# Patient Record
Sex: MALE | Race: BLACK OR AFRICAN AMERICAN | Employment: UNEMPLOYED | URBAN - METROPOLITAN AREA
[De-identification: names, ages, dates, MRNs, and addresses within clinical notes are randomized per-mention and may not be internally consistent; named-entity substitution may affect disease eponyms.]

---

## 2020-01-12 ENCOUNTER — APPOINTMENT (OUTPATIENT)
Dept: CT IMAGING | Age: 60
End: 2020-01-12
Attending: EMERGENCY MEDICINE
Payer: MEDICAID

## 2020-01-12 ENCOUNTER — APPOINTMENT (OUTPATIENT)
Dept: GENERAL RADIOLOGY | Age: 60
End: 2020-01-12
Attending: EMERGENCY MEDICINE
Payer: MEDICAID

## 2020-01-12 ENCOUNTER — HOSPITAL ENCOUNTER (EMERGENCY)
Age: 60
Discharge: HOME | End: 2020-01-12
Attending: EMERGENCY MEDICINE
Payer: MEDICAID

## 2020-01-12 VITALS
WEIGHT: 192.46 LBS | BODY MASS INDEX: 24.7 KG/M2 | OXYGEN SATURATION: 95 % | HEIGHT: 74 IN | TEMPERATURE: 98 F | RESPIRATION RATE: 19 BRPM | SYSTOLIC BLOOD PRESSURE: 211 MMHG | DIASTOLIC BLOOD PRESSURE: 125 MMHG | HEART RATE: 57 BPM

## 2020-01-12 DIAGNOSIS — R51.9 NONINTRACTABLE HEADACHE, UNSPECIFIED CHRONICITY PATTERN, UNSPECIFIED HEADACHE TYPE: ICD-10-CM

## 2020-01-12 DIAGNOSIS — I10 HYPERTENSION, UNSPECIFIED TYPE: Primary | ICD-10-CM

## 2020-01-12 LAB
ALBUMIN SERPL-MCNC: 3.7 G/DL (ref 3.5–5)
ALBUMIN/GLOB SERPL: 1 {RATIO} (ref 1.1–2.2)
ALP SERPL-CCNC: 73 U/L (ref 45–117)
ALT SERPL-CCNC: 45 U/L (ref 12–78)
ANION GAP SERPL CALC-SCNC: 3 MMOL/L (ref 5–15)
AST SERPL-CCNC: 27 U/L (ref 15–37)
ATRIAL RATE: 62 BPM
BASOPHILS # BLD: 0 K/UL (ref 0–0.1)
BASOPHILS NFR BLD: 1 % (ref 0–1)
BILIRUB SERPL-MCNC: 0.6 MG/DL (ref 0.2–1)
BUN SERPL-MCNC: 17 MG/DL (ref 6–20)
BUN/CREAT SERPL: 13 (ref 12–20)
CALCIUM SERPL-MCNC: 9.2 MG/DL (ref 8.5–10.1)
CALCULATED P AXIS, ECG09: 47 DEGREES
CALCULATED R AXIS, ECG10: 28 DEGREES
CALCULATED T AXIS, ECG11: 52 DEGREES
CHLORIDE SERPL-SCNC: 109 MMOL/L (ref 97–108)
CO2 SERPL-SCNC: 28 MMOL/L (ref 21–32)
CREAT SERPL-MCNC: 1.26 MG/DL (ref 0.7–1.3)
DIAGNOSIS, 93000: NORMAL
DIFFERENTIAL METHOD BLD: ABNORMAL
EOSINOPHIL # BLD: 0.1 K/UL (ref 0–0.4)
EOSINOPHIL NFR BLD: 1 % (ref 0–7)
ERYTHROCYTE [DISTWIDTH] IN BLOOD BY AUTOMATED COUNT: 13.2 % (ref 11.5–14.5)
GLOBULIN SER CALC-MCNC: 3.6 G/DL (ref 2–4)
GLUCOSE SERPL-MCNC: 112 MG/DL (ref 65–100)
HCT VFR BLD AUTO: 43.1 % (ref 36.6–50.3)
HGB BLD-MCNC: 14.8 G/DL (ref 12.1–17)
IMM GRANULOCYTES # BLD AUTO: 0 K/UL (ref 0–0.04)
IMM GRANULOCYTES NFR BLD AUTO: 0 % (ref 0–0.5)
LYMPHOCYTES # BLD: 2.2 K/UL (ref 0.8–3.5)
LYMPHOCYTES NFR BLD: 52 % (ref 12–49)
MCH RBC QN AUTO: 27.2 PG (ref 26–34)
MCHC RBC AUTO-ENTMCNC: 34.3 G/DL (ref 30–36.5)
MCV RBC AUTO: 79.1 FL (ref 80–99)
MONOCYTES # BLD: 0.4 K/UL (ref 0–1)
MONOCYTES NFR BLD: 10 % (ref 5–13)
NEUTS SEG # BLD: 1.6 K/UL (ref 1.8–8)
NEUTS SEG NFR BLD: 36 % (ref 32–75)
NRBC # BLD: 0 K/UL (ref 0–0.01)
NRBC BLD-RTO: 0 PER 100 WBC
P-R INTERVAL, ECG05: 174 MS
PLATELET # BLD AUTO: 219 K/UL (ref 150–400)
PMV BLD AUTO: 8.8 FL (ref 8.9–12.9)
POTASSIUM SERPL-SCNC: 3.9 MMOL/L (ref 3.5–5.1)
PROT SERPL-MCNC: 7.3 G/DL (ref 6.4–8.2)
Q-T INTERVAL, ECG07: 430 MS
QRS DURATION, ECG06: 94 MS
QTC CALCULATION (BEZET), ECG08: 436 MS
RBC # BLD AUTO: 5.45 M/UL (ref 4.1–5.7)
SODIUM SERPL-SCNC: 140 MMOL/L (ref 136–145)
VENTRICULAR RATE, ECG03: 62 BPM
WBC # BLD AUTO: 4.3 K/UL (ref 4.1–11.1)

## 2020-01-12 PROCEDURE — 36415 COLL VENOUS BLD VENIPUNCTURE: CPT

## 2020-01-12 PROCEDURE — 96375 TX/PRO/DX INJ NEW DRUG ADDON: CPT

## 2020-01-12 PROCEDURE — 71045 X-RAY EXAM CHEST 1 VIEW: CPT

## 2020-01-12 PROCEDURE — 70450 CT HEAD/BRAIN W/O DYE: CPT

## 2020-01-12 PROCEDURE — 85025 COMPLETE CBC W/AUTO DIFF WBC: CPT

## 2020-01-12 PROCEDURE — 74011250636 HC RX REV CODE- 250/636: Performed by: EMERGENCY MEDICINE

## 2020-01-12 PROCEDURE — 93005 ELECTROCARDIOGRAM TRACING: CPT

## 2020-01-12 PROCEDURE — 74011250637 HC RX REV CODE- 250/637: Performed by: EMERGENCY MEDICINE

## 2020-01-12 PROCEDURE — 80053 COMPREHEN METABOLIC PANEL: CPT

## 2020-01-12 PROCEDURE — 96374 THER/PROPH/DIAG INJ IV PUSH: CPT

## 2020-01-12 PROCEDURE — 99284 EMERGENCY DEPT VISIT MOD MDM: CPT

## 2020-01-12 PROCEDURE — 74011000250 HC RX REV CODE- 250: Performed by: EMERGENCY MEDICINE

## 2020-01-12 RX ORDER — HYDROCHLOROTHIAZIDE 25 MG/1
25 TABLET ORAL
Status: COMPLETED | OUTPATIENT
Start: 2020-01-12 | End: 2020-01-12

## 2020-01-12 RX ORDER — ACETAMINOPHEN 500 MG
1000 TABLET ORAL ONCE
Status: COMPLETED | OUTPATIENT
Start: 2020-01-12 | End: 2020-01-12

## 2020-01-12 RX ORDER — LABETALOL HYDROCHLORIDE 5 MG/ML
20 INJECTION, SOLUTION INTRAVENOUS ONCE
Status: COMPLETED | OUTPATIENT
Start: 2020-01-12 | End: 2020-01-12

## 2020-01-12 RX ORDER — KETOROLAC TROMETHAMINE 30 MG/ML
15 INJECTION, SOLUTION INTRAMUSCULAR; INTRAVENOUS
Status: COMPLETED | OUTPATIENT
Start: 2020-01-12 | End: 2020-01-12

## 2020-01-12 RX ORDER — HYDROCHLOROTHIAZIDE 25 MG/1
25 TABLET ORAL DAILY
Qty: 30 TAB | Refills: 2 | Status: SHIPPED | OUTPATIENT
Start: 2020-01-12 | End: 2020-01-22

## 2020-01-12 RX ORDER — ACETAMINOPHEN 325 MG/1
650 TABLET ORAL
Qty: 20 TAB | Refills: 0 | Status: SHIPPED | OUTPATIENT
Start: 2020-01-12 | End: 2022-03-24 | Stop reason: ALTCHOICE

## 2020-01-12 RX ADMIN — SODIUM CHLORIDE 500 ML: 900 INJECTION, SOLUTION INTRAVENOUS at 19:00

## 2020-01-12 RX ADMIN — LABETALOL HYDROCHLORIDE 20 MG: 5 INJECTION INTRAVENOUS at 19:00

## 2020-01-12 RX ADMIN — KETOROLAC TROMETHAMINE 15 MG: 30 INJECTION, SOLUTION INTRAMUSCULAR at 19:00

## 2020-01-12 RX ADMIN — HYDROCHLOROTHIAZIDE 25 MG: 25 TABLET ORAL at 21:22

## 2020-01-12 RX ADMIN — ACETAMINOPHEN 1000 MG: 500 TABLET ORAL at 19:00

## 2020-01-12 NOTE — ED PROVIDER NOTES
EMERGENCY DEPARTMENT HISTORY AND PHYSICAL EXAM      Date: 1/12/2020  Patient Name: [facility]    History of Presenting Illness     Chief Complaint   Patient presents with    Dizziness     patient reports dizziness and headache with position changes x1 week    Abscess     right lateral trunk patient reports a painful \"knot\"; reports it has been getting larger x2 months       History Provided By: Patient    HPI: [facility], 62 y.o. male with PMHx significant for medical problems as stated below, presents to the ED with cc of gradual onset, constant headache of mild intensity over the last 1 week. Patient reports headache is positional in nature. There is no associated vomiting, visual changes or any focal neurologic deficits. She reports noticing a knot on the right side of her trunk that has been there for 2 months. There is no associated fevers, chills, night sweats, chest pain, shortness of breath, or any other associated symptoms. No other exacerbating or militating factors. There are no other complaints, changes, or physical findings at this time. PCP: Other, MD Cuco    No current facility-administered medications on file prior to encounter. No current outpatient medications on file prior to encounter. Past History     Past Medical History:  No past medical history on file. Past Surgical History:  No past surgical history on file. Family History:  No family history on file. Social History:  Social History     Tobacco Use    Smoking status: Not on file   Substance Use Topics    Alcohol use: Not on file    Drug use: Not on file       Allergies:  No Known Allergies      Review of Systems   Review of Systems   Constitutional: Negative for chills, diaphoresis, fatigue and fever. HENT: Negative for ear pain and sore throat. Eyes: Negative for pain and redness. Respiratory: Negative for cough and shortness of breath.     Cardiovascular: Negative for chest pain and leg swelling. Gastrointestinal: Negative for abdominal pain, diarrhea, nausea and vomiting. Endocrine: Negative for cold intolerance and heat intolerance. Genitourinary: Negative for flank pain and hematuria. Musculoskeletal: Negative for back pain and neck stiffness. Skin: Negative for rash and wound. Neurological: Positive for headaches. Negative for dizziness and syncope. All other systems reviewed and are negative. Physical Exam   Physical Exam  Vitals signs and nursing note reviewed. Constitutional:       Appearance: He is well-developed. HENT:      Head: Normocephalic and atraumatic. Mouth/Throat:      Pharynx: No oropharyngeal exudate. Eyes:      Conjunctiva/sclera: Conjunctivae normal.      Pupils: Pupils are equal, round, and reactive to light. Neck:      Musculoskeletal: Normal range of motion. Cardiovascular:      Rate and Rhythm: Normal rate and regular rhythm. Heart sounds: No murmur. Pulmonary:      Effort: Pulmonary effort is normal. No respiratory distress. Breath sounds: Normal breath sounds. No wheezing. Abdominal:      General: Bowel sounds are normal. There is no distension. Palpations: Abdomen is soft. Tenderness: There is no tenderness. Musculoskeletal: Normal range of motion. General: No deformity. Skin:     General: Skin is warm and dry. Findings: No rash. Comments: There is a 2 cm epidermoid cyst along the right lateral trunk. There is no associated erythema or tenderness to palpation. These findings are not consistent with cellulitis or acute abscess. Neurological:      Mental Status: He is alert and oriented to person, place, and time. Coordination: Coordination normal.   Psychiatric:         Behavior: Behavior normal.         Diagnostic Study Results     Labs -     No results found for this or any previous visit (from the past 24 hour(s)).     Radiologic Studies -   XR CHEST PORT   Final Result IMPRESSION: No evidence of acute cardiopulmonary process. CT HEAD WO CONT   Final Result   IMPRESSION:      No acute intracranial abnormality        CT Results  (Last 48 hours)    None        CXR Results  (Last 48 hours)    None            Medical Decision Making   I am the first provider for this patient. I reviewed the vital signs, available nursing notes, past medical history, past surgical history, family history and social history. Vital Signs-Reviewed the patient's vital signs. No data found. Provider Notes (Medical Decision Making):   Patient with mild headache not consistent with subarachnoid hemorrhage, meningitis, or other acute emergent neurologic condition. Epidermoid cyst seen on exam that can be followed up as outpatient. Patient is noted to have elevated blood pressure and I will start him on patient's for this. ED Course:     Initial assessment performed. The patients presenting problems have been discussed, and they are in agreement with the care plan formulated and outlined with them. I have encouraged them to ask questions as they arise throughout their visit. Critical Care Time:     None    Disposition:  3:23 PM  Barbara You's  results have been reviewed with him. He has been counseled regarding his diagnosis. He verbally conveys understanding and agreement of the signs, symptoms, diagnosis, treatment and prognosis and additionally agrees to follow up as recommended with Cuco Gregorio MD in 24 - 48 hours. He also agrees with the care-plan and conveys that all of his questions have been answered. I have also put together some discharge instructions for him that include: 1) educational information regarding their diagnosis, 2) how to care for their diagnosis at home, as well a 3) list of reasons why they would want to return to the ED prior to their follow-up appointment, should their condition change. PLAN:  1.    Discharge Medication List as of 1/12/2020  9:18 PM      START taking these medications    Details   hydroCHLOROthiazide (HYDRODIURIL) 25 mg tablet Take 1 Tab by mouth daily for 10 days. , Normal, Disp-30 Tab, R-2      acetaminophen (TYLENOL) 325 mg tablet Take 2 Tabs by mouth every four (4) hours as needed for Pain., Normal, Disp-20 Tab, R-0           2. Follow-up Information     Follow up With Specialties Details Why Contact [facility] EMERGENCY DEPT Emergency Medicine In 1 day For a follow-up evaluation. [address] [place] [address] Internal Medicine In 1 week For a follow-up evaluation. [address] [facility]  [phone]          Return to ED if worse     Diagnosis     Clinical Impression:   1. Hypertension, unspecified type    2.  Nonintractable headache, unspecified chronicity pattern, unspecified headache type

## 2020-01-13 NOTE — DISCHARGE INSTRUCTIONS
Patient Education        Home Blood Pressure Test: About This Test  What is it? A home blood pressure test allows you to keep track of your blood pressure at home. Blood pressure is a measure of the force of blood against the walls of your arteries. Blood pressure readings include two numbers, such as 130/80 (say \"130 over 80\"). The first number is the systolic pressure. The second number is the diastolic pressure. Why is this test done? You may do this test at home to:  · Find out if you have high blood pressure. · Track your blood pressure if you have high blood pressure. · Track how well medicine is working to reduce high blood pressure. · Check how lifestyle changes, such as weight loss and exercise, are affecting blood pressure. How can you prepare for the test?  · Do not use caffeine, tobacco, or medicines known to raise blood pressure (such as nasal decongestant sprays) for at least 30 minutes before taking your blood pressure. · Do not exercise for at least 30 minutes before taking your blood pressure. What happens before the test?  Take your blood pressure while you feel comfortable and relaxed. Sit quietly with both feet on the floor for at least 5 minutes before the test.  What happens during the test?  · Sit with your arm slightly bent and resting on a table so that your upper arm is at the same level as your heart. · Roll up your sleeve or take off your shirt to expose your upper arm. · Wrap the blood pressure cuff around your upper arm so that the lower edge of the cuff is about 1 inch above the bend of your elbow. Proceed with the following steps depending on if you are using an automatic or manual pressure monitor. Automatic blood pressure monitors  · Press the on/off button on the automatic monitor and wait until the ready-to-measure \"heart\" symbol appears next to zero in the display window. · Press the start button. The cuff will inflate and deflate by itself.   · Your blood pressure numbers will appear on the screen. · Write your numbers in your log book, along with the date and time. Manual blood pressure monitors  · Place the earpieces of a stethoscope in your ears, and place the bell of the stethoscope over the artery, just below the cuff. · Close the valve on the rubber inflating bulb. · Squeeze the bulb rapidly with your opposite hand to inflate the cuff until the dial or column of mercury reads about 30 mm Hg higher than your usual systolic pressure. If you do not know your usual pressure, inflate the cuff to 210 mm Hg or until the pulse at your wrist disappears. · Open the pressure valve just slightly by twisting or pressing the valve on the bulb. · As you watch the pressure slowly fall, note the level on the dial at which you first start to hear a pulsing or tapping sound through the stethoscope. This is your systolic blood pressure. · Continue letting the air out slowly. The sounds will become muffled and will finally disappear. Note the pressure when the sounds completely disappear. This is your diastolic blood pressure. Let out all the remaining air. · Write your numbers in your log book, along with the date and time. What else should you know about the test?  It is more accurate to take the average of several readings made throughout the day than to rely on a single reading. It's normal for blood pressure to go up and down throughout the day. Follow-up care is a key part of your treatment and safety. Be sure to make and go to all appointments, and call your doctor if you are having problems. It's also a good idea to keep a list of the medicines you take. Where can you learn more? Go to http://be-oswaldo.info/. Enter C427 in the search box to learn more about \"Home Blood Pressure Test: About This Test.\"  Current as of: April 9, 2019  Content Version: 12.2  © 7598-0619 [employer].  Care instructions adapted under license by [facility] (which disclaims liability or warranty for this information). If you have questions about a medical condition or this instruction, always ask your healthcare professional. [address]  any warranty or liability for your use of this information. Patient Education        Learning About Diuretics for High Blood Pressure  Introduction  Diuretics help to lower blood pressure. This reduces your risk of a heart attack and stroke. It also reduces your risk of kidney disease. Diuretics cause your kidneys to remove sodium and water. They also relax the blood vessel walls. These help lower your blood pressure. Examples  · Chlorthalidone  · Hydrochlorothiazide  Possible side effects  There are some common side effects. They are:  · Too little potassium. · Feeling dizzy. · Rash. · Urinating a lot. · High blood sugar. (But this is not common.)  You may have other side effects. Check the information that comes with your medicine. What to know about taking this medicine  · You may take other medicines for blood pressure. Diuretics can help those work better. They can also prevent extra fluid in your body. · You may need to take potassium pills. Or you may have to watch how much potassium is in your food. Ask your doctor about this. · You may need blood tests to check your kidneys and your potassium level. · Take your medicines exactly as prescribed. Call your doctor if you think you are having a problem with your medicine. · Check with your doctor or pharmacist before you use any other medicines. This includes over-the-counter medicines. Make sure your doctor knows all of the medicines, vitamins, herbal products, and supplements you take. Taking some medicines together can cause problems. Where can you learn more? Go to http://be-oswaldo.info/. Enter Z660 in the search box to learn more about \"Learning About Diuretics for High Blood Pressure. \"  Current as of: April 9, 2019  Content Version: 12.2  © 7005-9993 [employer]. Care instructions adapted under license by [employer] (which disclaims liability or warranty for this information). If you have questions about a medical condition or this instruction, always ask your healthcare professional. [address] any warranty or liability for your use of this information. Patient Education        Headache: Care Instructions  Your Care Instructions    Headaches have many possible causes. Most headaches aren't a sign of a more serious problem, and they will get better on their own. Home treatment may help you feel better faster. The doctor has checked you carefully, but problems can develop later. If you notice any problems or new symptoms, get medical treatment right away. Follow-up care is a key part of your treatment and safety. Be sure to make and go to all appointments, and call your doctor if you are having problems. It's also a good idea to know your test results and keep a list of the medicines you take. How can you care for yourself at home? · Do not drive if you have taken a prescription pain medicine. · Rest in a quiet, dark room until your headache is gone. Close your eyes and try to relax or go to sleep. Don't watch TV or read. · Put a cold, moist cloth or cold pack on the painful area for 10 to 20 minutes at a time. Put a thin cloth between the cold pack and your skin. · Use a warm, moist towel or a heating pad set on low to relax tight shoulder and neck muscles. · Have someone gently massage your neck and shoulders. · Take pain medicines exactly as directed. ? If the doctor gave you a prescription medicine for pain, take it as prescribed. ? If you are not taking a prescription pain medicine, ask your doctor if you can take an over-the-counter medicine.   · Be careful not to take pain medicine more often than the instructions allow, because you may get worse or more frequent headaches when the medicine wears off. · Do not ignore new symptoms that occur with a headache, such as a fever, weakness or numbness, vision changes, or confusion. These may be signs of a more serious problem. To prevent headaches  · Keep a headache diary so you can figure out what triggers your headaches. Avoiding triggers may help you prevent headaches. Record when each headache began, how long it lasted, and what the pain was like (throbbing, aching, stabbing, or dull). Write down any other symptoms you had with the headache, such as nausea, flashing lights or dark spots, or sensitivity to bright light or loud noise. Note if the headache occurred near your period. List anything that might have triggered the headache, such as certain foods (chocolate, cheese, wine) or odors, smoke, bright light, stress, or lack of sleep. · Find healthy ways to deal with stress. Headaches are most common during or right after stressful times. Take time to relax before and after you do something that has caused a headache in the past.  · Try to keep your muscles relaxed by keeping good posture. Check your jaw, face, neck, and shoulder muscles for tension, and try relaxing them. When sitting at a desk, change positions often, and stretch for 30 seconds each hour. · Get plenty of sleep and exercise. · Eat regularly and well. Long periods without food can trigger a headache. · Treat yourself to a massage. Some people find that regular massages are very helpful in relieving tension. · Limit caffeine by not drinking too much coffee, tea, or soda. But don't quit caffeine suddenly, because that can also give you headaches. · Reduce eyestrain from computers by blinking frequently and looking away from the computer screen every so often. Make sure you have proper eyewear and that your monitor is set up properly, about an arm's length away. · Seek help if you have depression or anxiety.  Your headaches may be linked to these conditions. Treatment can both prevent headaches and help with symptoms of anxiety or depression. When should you call for help? Call 911 anytime you think you may need emergency care. For example, call if:    · You have signs of a stroke. These may include:  ? Sudden numbness, paralysis, or weakness in your face, arm, or leg, especially on only one side of your body. ? Sudden vision changes. ? Sudden trouble speaking. ? Sudden confusion or trouble understanding simple statements. ? Sudden problems with walking or balance. ? A sudden, severe headache that is different from past headaches.    Call your doctor now or seek immediate medical care if:    · You have a new or worse headache.     · Your headache gets much worse. Where can you learn more? Go to http://be-oswaldo.info/. Enter M271 in the search box to learn more about \"Headache: Care Instructions. \"  Current as of: March 28, 2019  Content Version: 12.2  © 4931-0265 [employer]. Care instructions adapted under license by [employer] (which disclaims liability or warranty for this information). If you have questions about a medical condition or this instruction, always ask your healthcare professional. [place] [address] any warranty or liability for your use of this information. [facility] Departments     For adult and child immunizations, family planning, TB screening, STD testing and women's health services. [facility]: [place] [phone]      [facility] 25   657 [facility]   [address] [place] [address]   170 [facility]: [address] [phone]      [address] [place] [address]          [address] [place] [address] [address]     For primary care services, woman and child wellness, and some clinics providing specialty care. U -- 1011 [facility]. [address] [place] [address] [place] cesia [facility] 200 [facility] [address] [place] [phone]   339 [facility] [address] [phone]   [address] [facility] 1604 [facility] 5850 [facility]  [phone]   [address] [facility] [address] [address] [address] [place] [phone]   [facility] 81 [facility] [phone]   [facility] [address] [place] [address] [phone]   [facility]: [facility] 700 [address], ext [address] 79 [facility] [address] [phone]     [address] [phone]   [facility] 5850 [facility]  [phone]   [employer]  [address] (www.[email or web address]/about/mission. asp) [phone]         Sexual Health/Woman Wellness Clinics    For STD/HIV testing and treatment, pregnancy testing and services, men's health, birth control services, LGBT services, and hepatitis/HPV vaccine services. [facility] [employer] for [place] [employer] [address] [facility] 75 [facility] [address] [facility] 1579 [address] [facility] [phone]   [facility] [address] 5th floor [phone]   Pregnancy [address] [facility] [address] [phone]         [place] 9942 [facility] [address] [facility] [address]   [phone]   [place]   [phone]   Women, Infant and Children's Services: [address] [phone]       [address] [phone]   [facility] Crisis Intervention   [phone]   [address] [place] [address]   [phone] 6125 [facility]     [phone]   [address] Crisis   1212 [facility] [phone]     Local Primary Care Physicians  64 [facility] [phone]  MD Mihai Lehman MD Merritt Elizondo MD [facility] Doctors [phone]  Praneeth Sample, [facility]  Rey Godfrey, MD Gail Young, MD Tianna Delong  [phone]  MD Leta Aguero MD [address] [facility] [address] [phone]  Anne Marie Maradiaga, MD Harvey Caro, MD Christi Jennings, MD Candace Bean, MD   [facility] [phone]  [facility] NTRNI EK, MD Lázaro Haines, MD Shey Schofield, NP [address] [phone]  Cortney Brown, MD Linda Leong, MD Tanvi Price, MD Simi Beaver, MD Forrest Rizzo, MD Melinda Fan, MD Hanna Oliveira, MD   33 57 [facility]  Yane Oliveira MD [facility] [phone]  Hai Mendoza, MD Viktoria Valencia, NP  Lashanda Draper, MD Raimundo Bobo, MD Billy Smith, MD Luis Corral, MD Jared Johnson, MD   9460 [facility] [phone]  Steph Newman, MD Marylee Stack, [facility]  Judah Calero, NP  Laila Emerson, MD Kelly Hogan, MD Nell Timmons, MD Sandy Lea, MD [facility] [phone]  MD Moshe Choudhury, MD Whit Qureshi, MD Rebekah Marrero, MD Cecile Clarke MD   [address] [phone]  Elayne Gallo, MD Ilene Harman, MD [place] [phone]  MD Yusef Briones, MD Kenia Glover MD   [facility] [phone]  MD Ana Chapin, MD Emmanuel Kaur, MD Shyann Neely, MD Tracee Nam, MD Alarconter Relic, NP  Jaden Salomon MD [facility]9 [facility]   [phone]  MD Halle Piñay, MD Yehuda Abreu, MD   2102 [facility] [phone]  Kody Mei, MD Jayden Macdonald, EN Mcleod, MICAH Mcleod, EN Méndez, MICAH Rowland, MD Abe Garcias, NP   Winston Schmitt,  Miscellaneous:  Gisel Taylor, [phone]

## 2021-11-02 ENCOUNTER — OFFICE VISIT (OUTPATIENT)
Dept: FAMILY MEDICINE CLINIC | Age: 61
End: 2021-11-02
Payer: COMMERCIAL

## 2021-11-02 DIAGNOSIS — R73.03 BORDERLINE DIABETES MELLITUS: ICD-10-CM

## 2021-11-02 DIAGNOSIS — Z76.89 ESTABLISHING CARE WITH NEW DOCTOR, ENCOUNTER FOR: Primary | ICD-10-CM

## 2021-11-02 DIAGNOSIS — Z12.11 COLON CANCER SCREENING: ICD-10-CM

## 2021-11-02 DIAGNOSIS — Z13.29 SCREENING FOR THYROID DISORDER: ICD-10-CM

## 2021-11-02 DIAGNOSIS — N52.9 ERECTILE DYSFUNCTION, UNSPECIFIED ERECTILE DYSFUNCTION TYPE: ICD-10-CM

## 2021-11-02 DIAGNOSIS — R35.0 FREQUENCY OF URINATION: ICD-10-CM

## 2021-11-02 DIAGNOSIS — Z12.5 SCREENING FOR PROSTATE CANCER: ICD-10-CM

## 2021-11-02 DIAGNOSIS — E78.5 DYSLIPIDEMIA (HIGH LDL; LOW HDL): ICD-10-CM

## 2021-11-02 DIAGNOSIS — I10 MALIGNANT HYPERTENSION: ICD-10-CM

## 2021-11-02 DIAGNOSIS — Z11.59 NEED FOR HEPATITIS C SCREENING TEST: ICD-10-CM

## 2021-11-02 DIAGNOSIS — E55.9 VITAMIN D DEFICIENCY: ICD-10-CM

## 2021-11-02 PROCEDURE — 99205 OFFICE O/P NEW HI 60 MIN: CPT | Performed by: INTERNAL MEDICINE

## 2021-11-02 RX ORDER — CLONIDINE HYDROCHLORIDE 0.1 MG/1
0.2 TABLET ORAL
Status: DISCONTINUED | OUTPATIENT
Start: 2021-11-02 | End: 2021-11-02

## 2021-11-02 RX ORDER — CLONIDINE HYDROCHLORIDE 0.1 MG/1
0.2 TABLET ORAL
Status: COMPLETED | OUTPATIENT
Start: 2021-11-02 | End: 2021-11-05

## 2021-11-02 RX ORDER — CLONIDINE HYDROCHLORIDE 0.1 MG/1
0.1 TABLET ORAL
Status: CANCELLED | OUTPATIENT
Start: 2021-11-02 | End: 2021-11-02

## 2021-11-02 RX ORDER — HYDROCHLOROTHIAZIDE 12.5 MG/1
CAPSULE ORAL
COMMUNITY
Start: 2021-08-10 | End: 2021-11-23 | Stop reason: DRUGHIGH

## 2021-11-02 RX ORDER — LISINOPRIL 20 MG/1
20 TABLET ORAL DAILY
Qty: 30 TABLET | Refills: 0 | Status: SHIPPED | OUTPATIENT
Start: 2021-11-02 | End: 2021-11-09 | Stop reason: DRUGHIGH

## 2021-11-02 RX ORDER — AMLODIPINE BESYLATE 10 MG/1
10 TABLET ORAL DAILY
Qty: 30 TABLET | Refills: 3 | Status: SHIPPED | OUTPATIENT
Start: 2021-11-02 | End: 2022-02-16 | Stop reason: SDUPTHER

## 2021-11-02 NOTE — PATIENT INSTRUCTIONS
Lisinopril (Prinivil, Zestril) - (By mouth) Why this medicine is used:  
Treats high blood pressure and heart failure. Contact a nurse or doctor right away if you have: · Blistering, peeling, red skin rash · Change in how much or how often you urinate · Confusion, weakness, uneven heartbeat, trouble breathing · Lightheadedness, dizziness, fainting · Numbness or tingling in your hands, feet, or lips Common side effects: · Dry cough © 2017 [address] Information is for End User's use only and may not be sold, redistributed or otherwise used for commercial purposes.

## 2021-11-02 NOTE — PROGRESS NOTES
Order placed for 0.1/2 tablet clonidine , per Verbal Order from Dr. Monik Mccann on 11/2/2021 due to high Blood Pressure 183/117. Recheck  BP the same.  Reported to

## 2021-11-02 NOTE — PROGRESS NOTES
Chief Complaint   Patient presents with    New Patient    Cyst     body trunk        HPI:  Gustavo Garcia is a 62 y.o. AA male with h/o hypertension presents accompanied by spouse to establish care. Blood pressure is markedly elevated. Patient has not been taking Amlodipine and HCTZ. Denies headaches, chest pain ankle swelling. Patient is also complaining of a lump/cyst on the side without pain. Also, he has additional complaints of difficulty sustaining erection. Erectile dysfunction could  multple causes including uncontrolled blood pressure    Review of Systems  As per hpi    Past Medical History:   Diagnosis Date    Hypertension      Past Surgical History:   Procedure Laterality Date    IR CHOLECYSTOSTOMY PERCUTANEOUS       Social History     Socioeconomic History    Marital status:      Spouse name: Not on file    Number of children: Not on file    Years of education: Not on file    Highest education level: Not on file   Tobacco Use    Smoking status: Current Every Day Smoker    Smokeless tobacco: Never Used   Substance and Sexual Activity    Alcohol use: Yes     Comment: social    Drug use: Yes     Types: Marijuana    Sexual activity: Yes     Partners: Female     Social Determinants of Health     Financial Resource Strain:     Difficulty of Paying Living Expenses:    Food Insecurity:     Worried About Running Out of Food in the Last Year:     Ran Out of Food in the Last Year:    Transportation Needs:     Lack of Transportation (Medical):      Lack of Transportation (Non-Medical):    Physical Activity:     Days of Exercise per Week:     Minutes of Exercise per Session:    Stress:     Feeling of Stress :    Social Connections:     Frequency of Communication with Friends and Family:     Frequency of Social Gatherings with Friends and Family:     Attends Oriental orthodox Services:     Active Member of Clubs or Organizations:     Attends Club or Organization Meetings:     Marital Status:      Family History   Problem Relation Age of Onset    Diabetes Mother      Current Outpatient Medications   Medication Sig Dispense Refill    hydroCHLOROthiazide (MICROZIDE) 12.5 mg capsule TAKE 1 CAPSULE BY MOUTH EVERY DAY IN THE MORNING      acetaminophen (TYLENOL) 325 mg tablet Take 2 Tabs by mouth every four (4) hours as needed for Pain. 20 Tab 0     No Known Allergies    Objective:  Visit Vitals  BP (!) 183/117   Pulse 71   Temp 98.4 °F (36.9 °C) (Temporal)   Resp 20   Ht 6' 2\" (1.88 m)   Wt 205 lb (93 kg)   SpO2 100%   BMI 26.32 kg/m²     Physical Exam:   General appearance - alert, well appearing in no distress  Mental status - alert, oriented to person, place, and time  EYE-PERRL, EOMI  ENT-ENT exam normal, no neck nodes or sinus tenderness  Neck - supple, no significant adenopathy   Chest - clear to auscultation, no wheezes, rales or rhonchi  Heart - normal rate, regular rhythm, no murmurs  Abdomen - soft, nontender, nondistended, no organomegaly  Ext-peripheral pulses normal, no pedal edema  Neuro -alert, oriented, normal speech, no focal findings   Back-full range of motion, no tenderness, palpable spasm or pain on motion     Results for orders placed or performed during the hospital encounter of 01/12/20   CBC WITH AUTOMATED DIFF   Result Value Ref Range    WBC 4.3 4.1 - 11.1 K/uL    RBC 5.45 4.10 - 5.70 M/uL    HGB 14.8 12.1 - 17.0 g/dL    HCT 43.1 36.6 - 50.3 %    MCV 79.1 (L) 80.0 - 99.0 FL    MCH 27.2 26.0 - 34.0 PG    MCHC 34.3 30.0 - 36.5 g/dL    RDW 13.2 11.5 - 14.5 %    PLATELET 080 416 - 107 K/uL    MPV 8.8 (L) 8.9 - 12.9 FL    NRBC 0.0 0  WBC    ABSOLUTE NRBC 0.00 0.00 - 0.01 K/uL    NEUTROPHILS 36 32 - 75 %    LYMPHOCYTES 52 (H) 12 - 49 %    MONOCYTES 10 5 - 13 %    EOSINOPHILS 1 0 - 7 %    BASOPHILS 1 0 - 1 %    IMMATURE GRANULOCYTES 0 0.0 - 0.5 %    ABS. NEUTROPHILS 1.6 (L) 1.8 - 8.0 K/UL    ABS. LYMPHOCYTES 2.2 0.8 - 3.5 K/UL    ABS. MONOCYTES 0.4 0.0 - 1.0 K/UL    ABS. EOSINOPHILS 0.1 0.0 - 0.4 K/UL    ABS. BASOPHILS 0.0 0.0 - 0.1 K/UL    ABS. IMM. GRANS. 0.0 0.00 - 0.04 K/UL    DF AUTOMATED     METABOLIC PANEL, COMPREHENSIVE   Result Value Ref Range    Sodium 140 136 - 145 mmol/L    Potassium 3.9 3.5 - 5.1 mmol/L    Chloride 109 (H) 97 - 108 mmol/L    CO2 28 21 - 32 mmol/L    Anion gap 3 (L) 5 - 15 mmol/L    Glucose 112 (H) 65 - 100 mg/dL    BUN 17 6 - 20 MG/DL    Creatinine 1.26 0.70 - 1.30 MG/DL    BUN/Creatinine ratio 13 12 - 20      GFR est AA >60 >60 ml/min/1.73m2    GFR est non-AA 59 (L) >60 ml/min/1.73m2    Calcium 9.2 8.5 - 10.1 MG/DL    Bilirubin, total 0.6 0.2 - 1.0 MG/DL    ALT (SGPT) 45 12 - 78 U/L    AST (SGOT) 27 15 - 37 U/L    Alk. phosphatase 73 45 - 117 U/L    Protein, total 7.3 6.4 - 8.2 g/dL    Albumin 3.7 3.5 - 5.0 g/dL    Globulin 3.6 2.0 - 4.0 g/dL    A-G Ratio 1.0 (L) 1.1 - 2.2     EKG, 12 LEAD, INITIAL   Result Value Ref Range    Ventricular Rate 62 BPM    Atrial Rate 62 BPM    P-R Interval 174 ms    QRS Duration 94 ms    Q-T Interval 430 ms    QTC Calculation (Bezet) 436 ms    Calculated P Axis 47 degrees    Calculated R Axis 28 degrees    Calculated T Axis 52 degrees    Diagnosis       Normal sinus rhythm  Voltage criteria for left ventricular hypertrophy  Nonspecific T wave abnormality  No previous ECGs available  Confirmed by HUMBERTO Prather ([id number]) on 1/12/2020 11:57:19 PM       Assessment/Plan:  Diagnoses and all orders for this visit:    Establishing care with new doctor, encounter for  -     CBC W/O DIFF; Future    Malignant hypertension  -     METABOLIC PANEL, COMPREHENSIVE; Future  -     CBC W/O DIFF; Future  -     amLODIPine (NORVASC) 10 mg tablet; Take 1 Tablet by mouth daily. , Normal, Disp-30 Tablet, R-3  -     Discontinue: cloNIDine HCL (CATAPRES) tablet 0.2 mg; 0.2 mg, Oral, NOW, 1 dose, On Tue 11/2/21 at 1000  -     lisinopriL (PRINIVIL, ZESTRIL) 20 mg tablet; Take 1 Tablet by mouth daily. , Normal, Disp-30 Tablet, R-0  -     cloNIDine HCL (CATAPRES) tablet 0.2 mg; 0.2 mg, Oral, NOW, 1 dose, On Tue 11/2/21 at 1900    Need for hepatitis C screening test  -     HEPATITIS C AB; Future    Dyslipidemia (high LDL; low HDL)  -     LIPID PANEL; Future    Borderline diabetes mellitus  -     HEMOGLOBIN A1C WITH EAG; Future    Vitamin D deficiency  -     VITAMIN D, 25 HYDROXY; Future    Screening for prostate cancer  -     PSA, DIAGNOSTIC (PROSTATE SPECIFIC AG); Future    Frequency of urination  -     URINALYSIS W/ RFLX MICROSCOPIC; Future    Screening for thyroid disorder  -     TSH 3RD GENERATION; Future    Colon cancer screening  -     REFERRAL TO GASTROENTEROLOGY    Erectile dysfunction, unspecified erectile dysfunction type  -     TESTOSTERONE, FREE & TOTAL; Future    Other orders  -     Cancel: cloNIDine HCL (CATAPRES) tablet 0.1 mg; 0.1 mg, Oral, NOW, 1 dose, On Tue 11/2/21 at 1000        Patient Instructions      Lisinopril (Prinivil, Zestril) - (By mouth)   Why this medicine is used:   Treats high blood pressure and heart failure. Contact a nurse or doctor right away if you have:  · Blistering, peeling, red skin rash  · Change in how much or how often you urinate  · Confusion, weakness, uneven heartbeat, trouble breathing  · Lightheadedness, dizziness, fainting  · Numbness or tingling in your hands, feet, or lips     Common side effects:  · Dry cough  © 2017 [facility] [place] Information is for End User's use only and may not be sold, redistributed or otherwise used for commercial purposes. Follow-up and Dispositions    · Return in about 1 week (around 11/9/2021) for f/u results and BP.

## 2021-11-05 VITALS
RESPIRATION RATE: 20 BRPM | BODY MASS INDEX: 26.31 KG/M2 | OXYGEN SATURATION: 100 % | SYSTOLIC BLOOD PRESSURE: 183 MMHG | WEIGHT: 205 LBS | HEART RATE: 71 BPM | HEIGHT: 74 IN | DIASTOLIC BLOOD PRESSURE: 117 MMHG | TEMPERATURE: 98.4 F

## 2021-11-05 RX ADMIN — CLONIDINE HYDROCHLORIDE 0.2 MG: 0.1 TABLET ORAL at 13:01

## 2021-11-09 ENCOUNTER — OFFICE VISIT (OUTPATIENT)
Dept: FAMILY MEDICINE CLINIC | Age: 61
End: 2021-11-09
Payer: COMMERCIAL

## 2021-11-09 VITALS
BODY MASS INDEX: 26.69 KG/M2 | OXYGEN SATURATION: 98 % | RESPIRATION RATE: 20 BRPM | TEMPERATURE: 98.9 F | DIASTOLIC BLOOD PRESSURE: 87 MMHG | WEIGHT: 208 LBS | HEIGHT: 74 IN | SYSTOLIC BLOOD PRESSURE: 151 MMHG | HEART RATE: 70 BPM

## 2021-11-09 DIAGNOSIS — E55.9 VITAMIN D DEFICIENCY: ICD-10-CM

## 2021-11-09 DIAGNOSIS — I10 HYPERTENSION GOAL BP (BLOOD PRESSURE) < 130/80: Primary | ICD-10-CM

## 2021-11-09 DIAGNOSIS — R76.8 HEPATITIS C ANTIBODY TEST POSITIVE: ICD-10-CM

## 2021-11-09 PROCEDURE — 99214 OFFICE O/P EST MOD 30 MIN: CPT | Performed by: INTERNAL MEDICINE

## 2021-11-09 RX ORDER — ACETAMINOPHEN 500 MG
2000 TABLET ORAL DAILY
Qty: 90 CAPSULE | Refills: 1 | Status: SHIPPED | OUTPATIENT
Start: 2021-11-09 | End: 2022-02-16 | Stop reason: SDUPTHER

## 2021-11-09 RX ORDER — LISINOPRIL 40 MG/1
40 TABLET ORAL DAILY
Qty: 30 TABLET | Refills: 3 | Status: SHIPPED | OUTPATIENT
Start: 2021-11-09 | End: 2021-12-07 | Stop reason: ALTCHOICE

## 2021-11-09 NOTE — PATIENT INSTRUCTIONS
Hepatitis C: Care Instructions  Overview     Hepatitis C is an infection of the liver caused by a virus. This virus spreads when blood or body fluids from an infected person enter another person's body. This can happen when people share needles, razor blades, or toothbrushes. It can also spread through sex. The virus doesn't always cause symptoms. But you may feel tired. And you may have a headache, sore muscles, nausea, and pain in the upper right belly. Other symptoms include yellowish skin and dark urine. Home treatment can help ease symptoms. And your doctor may prescribe antiviral medicine. Long-term infection can lead to severe liver damage. So make sure to go to your follow-up appointments. Follow-up care is a key part of your treatment and safety. Be sure to make and go to all appointments, and call your doctor if you are having problems. It's also a good idea to know your test results and keep a list of the medicines you take. How can you care for yourself at home? · Be safe with medicines. If your doctor prescribes antiviral medicine, take it exactly as prescribed. Call your doctor if you think you are having a problem with your medicine. · Do not drink alcohol. Alcohol can damage the liver. Tell your doctor if you need help to quit. Counseling, support groups, and sometimes medicines can help you stay sober. · Do not take drugs or herbal medicines. They can make liver problems worse. · Make sure your doctor knows all of the medicines you take. Some medicines, such as acetaminophen (Tylenol), can make liver problems worse. Do not take any new medicines unless your doctor tells you to. This includes over-the-counter medicines. · Maintain a healthy lifestyle. Get plenty of exercise if you feel up to it. Eat a healthy diet. · Drink plenty of fluids.  If you have kidney, heart, or liver disease and have to limit fluids, talk with your doctor before you increase the amount of fluids you drink.  · Get the vaccines (if you have not already) to protect yourself from hepatitis A and hepatitis B, influenza, and pneumococcus. · The infection can make you itch. Keep cool and stay out of the sun. Try to wear cotton clothing. Talk to your doctor about medicines for itching. Follow the instructions on the label. · If you feel depressed, talk to your doctor about treatment. Many people who have long-term illnesses get depressed. Keep in mind that antiviral medicine can make depression worse. To avoid spreading hepatitis C to others  · Tell the people that you live with or have sex with about your illness as soon as you can. · Don't share needles to inject drugs. Don't share other equipment (such as cotton, spoons, and water) with others. Find out if a needle exchange program is available in your area, and use it. Get into a drug treatment program.  · Practice safer sex. Reduce your number of sex partners if you have more than one. Unless you are in a long-term relationship in which neither partner has sex with anyone else, always use latex condoms when you have sex. · Don't donate blood or blood products, organs, semen, or eggs (ova). · Make sure that all equipment is sterilized if you get a tattoo, have your body pierced, or have acupuncture. · Do not share your personal items. These include razors, toothbrushes, towels, and nail files. · Tell your doctor, dentist, and anyone else who may come in contact with your blood about your illness. · Prevent others from coming in contact with your blood and other body fluids. Keep any cuts, scrapes, or blisters covered. · Wash your handsand any object that has come in contact with your bloodthoroughly with water and soap. When should you call for help? Call 911 anytime you think you may need emergency care. For example, call if:    · You have trouble breathing.     · You vomit blood or what looks like coffee grounds.    Call your doctor now or seek immediate medical care if:    · You feel very sleepy or confused.     · You have a fever.     · There is a new or increasing yellow tint to your skin or the whites of your eyes.     · You have new or worse belly pain.     · You have any abnormal bleeding, such as:  ? Nosebleeds. ? Vaginal bleeding that is different (heavier, more frequent, at a different time of the month) than what you are used to.  ? Bloody or black stools, or rectal bleeding. ? Bloody or pink urine. Watch closely for changes in your health, and be sure to contact your doctor if:    · You have any problems.     · Your belly is getting bigger.     · You are gaining weight. Where can you learn more? Go to http://www.gray.com/  Enter Z142 in the search box to learn more about \"Hepatitis C: Care Instructions. \"  Current as of: July 1, 2021               Content Version: 13.0  © 9562-5840 [employer]. Care instructions adapted under license by [employer] (which disclaims liability or warranty for this information). If you have questions about a medical condition or this instruction, always ask your healthcare professional. [place] [address] any warranty or liability for your use of this information.

## 2021-11-09 NOTE — PROGRESS NOTES
Chief Complaint   Patient presents with    Blood Pressure Check    Letter for School/Work     return back to work on 11/10/2021     HPI:  Nikhil Franklin is a 62 y.o. AA male presents to follow up blood pressure treatment and to discuss/review lab results. Serum Vit D level is slightly low. Supplement has been called in to pharmacy. Testosterone level is normal.    Cholesterol level is normal. There is mild renal insufficiency, advised to increase water intake. Hep C AB is positive  Blood pressure is better but not at goal. Medication will be adjusted. Review of Systems  As per hpi    Past Medical History:   Diagnosis Date    Hypertension      Past Surgical History:   Procedure Laterality Date    IR CHOLECYSTOSTOMY PERCUTANEOUS       Social History     Socioeconomic History    Marital status:    Tobacco Use    Smoking status: Current Every Day Smoker    Smokeless tobacco: Never Used   Substance and Sexual Activity    Alcohol use: Yes     Comment: social    Drug use: Yes     Types: Marijuana    Sexual activity: Yes     Partners: Female     Family History   Problem Relation Age of Onset    Diabetes Mother      Current Outpatient Medications   Medication Sig Dispense Refill    hydroCHLOROthiazide (MICROZIDE) 12.5 mg capsule TAKE 1 CAPSULE BY MOUTH EVERY DAY IN THE MORNING      amLODIPine (NORVASC) 10 mg tablet Take 1 Tablet by mouth daily. 30 Tablet 3    lisinopriL (PRINIVIL, ZESTRIL) 20 mg tablet Take 1 Tablet by mouth daily. 30 Tablet 0    acetaminophen (TYLENOL) 325 mg tablet Take 2 Tabs by mouth every four (4) hours as needed for Pain.  20 Tab 0     No Known Allergies    Objective:  Visit Vitals  BP (!) 151/87 Comment: took BP medication   Pulse 70   Temp 98.9 °F (37.2 °C) (Temporal)   Resp 20   Ht 6' 2\" (1.88 m)   Wt 208 lb (94.3 kg)   SpO2 98%   BMI 26.71 kg/m²     Physical Exam:   General appearance - alert, well appearing in no distress  Mental status - alert, oriented to person, place, and time  Chest - clear to auscultation, no wheezes, rales or rhonchi  Heart - normal rate, regular rhythm  Abdomen - soft, nontender, nondistended, no organomegaly  Ext-peripheral pulses normal, no pedal edema    Results for orders placed or performed in visit on 11/02/21   TESTOSTERONE, FREE & TOTAL   Result Value Ref Range    Testosterone 833 264 - 916 ng/dL    Free testosterone (Direct) 7.7 6.6 - 18.1 pg/mL   TSH 3RD GENERATION   Result Value Ref Range    TSH 0.91 0.36 - 3.74 uIU/mL   URINALYSIS W/ RFLX MICROSCOPIC   Result Value Ref Range    Color YELLOW/STRAW      Appearance CLEAR CLEAR      Specific gravity 1.016 1.003 - 1.030      pH (UA) 5.0 5.0 - 8.0      Protein Negative Negative mg/dL    Glucose Negative Negative mg/dL    Ketone Negative Negative mg/dL    Bilirubin Negative Negative      Blood Negative Negative      Urobilinogen 0.2 0.2 - 1.0 EU/dL    Nitrites Negative Negative      Leukocyte Esterase Negative Negative     PSA, DIAGNOSTIC (PROSTATE SPECIFIC AG)   Result Value Ref Range    Prostate Specific Ag 1.6 0.01 - 4.0 ng/mL   VITAMIN D, 25 HYDROXY   Result Value Ref Range    Vitamin D 25-Hydroxy 21.8 (L) 30 - 100 ng/mL   HEMOGLOBIN A1C WITH EAG   Result Value Ref Range    Hemoglobin A1c 5.5 4.0 - 5.6 %    Est. average glucose 111 mg/dL   LIPID PANEL   Result Value Ref Range    Cholesterol, total 122 <200 MG/DL    Triglyceride 130 <150 MG/DL    HDL Cholesterol 40 MG/DL    LDL, calculated 56 0 - 100 MG/DL    VLDL, calculated 26 MG/DL    CHOL/HDL Ratio 3.1 0.0 - 5.0     CBC W/O DIFF   Result Value Ref Range    WBC 4.2 4.1 - 11.1 K/uL    RBC 5.07 4. 10 - 5.70 M/uL    HGB 13.9 12.1 - 17.0 g/dL    HCT 41.1 36.6 - 50.3 %    MCV 81.1 80.0 - 99.0 FL    MCH 27.4 26.0 - 34.0 PG    MCHC 33.8 30.0 - 36.5 g/dL    RDW 13.7 11.5 - 14.5 %    PLATELET 871 381 - 667 K/uL    MPV 8.7 (L) 8.9 - 12.9 FL    NRBC 0.0 0  WBC    ABSOLUTE NRBC 0.00 0.00 - 3.72 K/uL   METABOLIC PANEL, COMPREHENSIVE   Result Value Ref Range    Sodium 137 136 - 145 mmol/L    Potassium 4.4 3.5 - 5.1 mmol/L    Chloride 108 97 - 108 mmol/L    CO2 28 21 - 32 mmol/L    Anion gap 1 (L) 5 - 15 mmol/L    Glucose 107 (H) 65 - 100 mg/dL    BUN 17 6 - 20 MG/DL    Creatinine 1.37 (H) 0.70 - 1.30 MG/DL    BUN/Creatinine ratio 12 12 - 20      GFR est AA >60 >60 ml/min/1.73m2    GFR est non-AA 53 (L) >60 ml/min/1.73m2    Calcium 9.5 8.5 - 10.1 MG/DL    Bilirubin, total 0.4 0.2 - 1.0 MG/DL    ALT (SGPT) 37 12 - 78 U/L    AST (SGOT) 27 15 - 37 U/L    Alk. phosphatase 67 45 - 117 U/L    Protein, total 7.0 6.4 - 8.2 g/dL    Albumin 3.6 3.5 - 5.0 g/dL    Globulin 3.4 2.0 - 4.0 g/dL    A-G Ratio 1.1 1.1 - 2.2     HEPATITIS C AB   Result Value Ref Range    Hepatitis C virus Ab >11.00 Index    Hep C virus Ab Interp. REACTIVE (A) NONREACTIVE         Assessment/Plan:  Diagnoses and all orders for this visit:    Hypertension goal BP (blood pressure) < 130/80  -     lisinopriL (PRINIVIL, ZESTRIL) 40 mg tablet; Take 1 Tablet by mouth daily. , Normal, Disp-30 Tablet, R-3    Hepatitis C antibody test positive  -     HCV RNA THANIA QUALITATIVE; Future    Vitamin D deficiency  -     cholecalciferol (VITAMIN D3) (2,000 UNITS /50 MCG) cap capsule; Take 1 Capsule by mouth daily. , Normal, Disp-90 Capsule, R-1      Patient Instructions          Hepatitis C: Care Instructions  Overview     Hepatitis C is an infection of the liver caused by a virus. This virus spreads when blood or body fluids from an infected person enter another person's body. This can happen when people share needles, razor blades, or toothbrushes. It can also spread through sex. The virus doesn't always cause symptoms. But you may feel tired. And you may have a headache, sore muscles, nausea, and pain in the upper right belly. Other symptoms include yellowish skin and dark urine. Home treatment can help ease symptoms. And your doctor may prescribe antiviral medicine.   Long-term infection can lead to severe liver damage. So make sure to go to your follow-up appointments. Follow-up care is a key part of your treatment and safety. Be sure to make and go to all appointments, and call your doctor if you are having problems. It's also a good idea to know your test results and keep a list of the medicines you take. How can you care for yourself at home? · Be safe with medicines. If your doctor prescribes antiviral medicine, take it exactly as prescribed. Call your doctor if you think you are having a problem with your medicine. · Do not drink alcohol. Alcohol can damage the liver. Tell your doctor if you need help to quit. Counseling, support groups, and sometimes medicines can help you stay sober. · Do not take drugs or herbal medicines. They can make liver problems worse. · Make sure your doctor knows all of the medicines you take. Some medicines, such as acetaminophen (Tylenol), can make liver problems worse. Do not take any new medicines unless your doctor tells you to. This includes over-the-counter medicines. · Maintain a healthy lifestyle. Get plenty of exercise if you feel up to it. Eat a healthy diet. · Drink plenty of fluids. If you have kidney, heart, or liver disease and have to limit fluids, talk with your doctor before you increase the amount of fluids you drink. · Get the vaccines (if you have not already) to protect yourself from hepatitis A and hepatitis B, influenza, and pneumococcus. · The infection can make you itch. Keep cool and stay out of the sun. Try to wear cotton clothing. Talk to your doctor about medicines for itching. Follow the instructions on the label. · If you feel depressed, talk to your doctor about treatment. Many people who have long-term illnesses get depressed. Keep in mind that antiviral medicine can make depression worse. To avoid spreading hepatitis C to others  · Tell the people that you live with or have sex with about your illness as soon as you can.   · Don't share needles to inject drugs. Don't share other equipment (such as cotton, spoons, and water) with others. Find out if a needle exchange program is available in your area, and use it. Get into a drug treatment program.  · Practice safer sex. Reduce your number of sex partners if you have more than one. Unless you are in a long-term relationship in which neither partner has sex with anyone else, always use latex condoms when you have sex. · Don't donate blood or blood products, organs, semen, or eggs (ova). · Make sure that all equipment is sterilized if you get a tattoo, have your body pierced, or have acupuncture. · Do not share your personal items. These include razors, toothbrushes, towels, and nail files. · Tell your doctor, dentist, and anyone else who may come in contact with your blood about your illness. · Prevent others from coming in contact with your blood and other body fluids. Keep any cuts, scrapes, or blisters covered. · Wash your handsand any object that has come in contact with your bloodthoroughly with water and soap. When should you call for help? Call 911 anytime you think you may need emergency care. For example, call if:    · You have trouble breathing.     · You vomit blood or what looks like coffee grounds. Call your doctor now or seek immediate medical care if:    · You feel very sleepy or confused.     · You have a fever.     · There is a new or increasing yellow tint to your skin or the whites of your eyes.     · You have new or worse belly pain.     · You have any abnormal bleeding, such as:  ? Nosebleeds. ? Vaginal bleeding that is different (heavier, more frequent, at a different time of the month) than what you are used to.  ? Bloody or black stools, or rectal bleeding. ? Bloody or pink urine. Watch closely for changes in your health, and be sure to contact your doctor if:    · You have any problems.     · Your belly is getting bigger.     · You are gaining weight.    Where can you learn more? Go to http://www.gray.com/  Enter R920 in the search box to learn more about \"Hepatitis C: Care Instructions. \"  Current as of: July 1, 2021               Content Version: 13.0  © 3522-8741 [employer]. Care instructions adapted under license by [employer] (which disclaims liability or warranty for this information). If you have questions about a medical condition or this instruction, always ask your healthcare professional. [place] [address] any warranty or liability for your use of this information. Follow-up and Dispositions    · Return in about 2 weeks (around 11/23/2021) for f/u result and blood pressure.

## 2021-11-23 ENCOUNTER — OFFICE VISIT (OUTPATIENT)
Dept: FAMILY MEDICINE CLINIC | Age: 61
End: 2021-11-23
Payer: COMMERCIAL

## 2021-11-23 VITALS
RESPIRATION RATE: 20 BRPM | DIASTOLIC BLOOD PRESSURE: 87 MMHG | HEIGHT: 74 IN | BODY MASS INDEX: 26.18 KG/M2 | SYSTOLIC BLOOD PRESSURE: 152 MMHG | TEMPERATURE: 98.8 F | HEART RATE: 77 BPM | OXYGEN SATURATION: 93 % | WEIGHT: 204 LBS

## 2021-11-23 DIAGNOSIS — B17.10 ACUTE HEPATITIS C VIRUS INFECTION WITHOUT HEPATIC COMA: ICD-10-CM

## 2021-11-23 DIAGNOSIS — I10 HYPERTENSION GOAL BP (BLOOD PRESSURE) < 130/80: Primary | ICD-10-CM

## 2021-11-23 PROCEDURE — 99214 OFFICE O/P EST MOD 30 MIN: CPT | Performed by: INTERNAL MEDICINE

## 2021-11-23 RX ORDER — HYDROCHLOROTHIAZIDE 25 MG/1
25 TABLET ORAL DAILY
Qty: 90 TABLET | Refills: 1 | Status: SHIPPED | OUTPATIENT
Start: 2021-11-23 | End: 2022-02-16 | Stop reason: ALTCHOICE

## 2021-11-23 NOTE — PROGRESS NOTES
Chief Complaint   Patient presents with    Blood Pressure Check     HPI:  Henry Carlson is a 62 y.o. AA male presents for blood pressure check and result. Blood pressure is better but not at goal. Patient is not taking medications as directed. Hep C infection is confirmed with positive Hep C RNA. A referral to hepatologist has been placed. Review of Systems  As per hpi    Past Medical History:   Diagnosis Date    Hypercholesterolemia     Hypertension      Past Surgical History:   Procedure Laterality Date    IR CHOLECYSTOSTOMY PERCUTANEOUS       Social History     Socioeconomic History    Marital status:    Tobacco Use    Smoking status: Current Every Day Smoker    Smokeless tobacco: Never Used   Substance and Sexual Activity    Alcohol use: Yes     Comment: social    Drug use: Yes     Types: Marijuana    Sexual activity: Yes     Partners: Female     Family History   Problem Relation Age of Onset    Diabetes Mother      Current Outpatient Medications   Medication Sig Dispense Refill    cholecalciferol (VITAMIN D3) (2,000 UNITS /50 MCG) cap capsule Take 1 Capsule by mouth daily. 90 Capsule 1    lisinopriL (PRINIVIL, ZESTRIL) 40 mg tablet Take 1 Tablet by mouth daily. 30 Tablet 3    hydroCHLOROthiazide (MICROZIDE) 12.5 mg capsule TAKE 1 CAPSULE BY MOUTH EVERY DAY IN THE MORNING      amLODIPine (NORVASC) 10 mg tablet Take 1 Tablet by mouth daily. 30 Tablet 3    acetaminophen (TYLENOL) 325 mg tablet Take 2 Tabs by mouth every four (4) hours as needed for Pain.  20 Tab 0     No Known Allergies    Objective:  Visit Vitals  BP (!) 152/87 Comment: took medication   Pulse 77   Temp 98.8 °F (37.1 °C) (Temporal)   Resp 20   Ht 6' 2\" (1.88 m)   Wt 204 lb (92.5 kg)   SpO2 93%   BMI 26.19 kg/m²     Physical Exam:   General appearance - alert, well appearing in no distress  Mental status - alert, oriented to person, place, and time  Chest - clear to auscultation, no wheezes, rales or rhonchi    Heart - normal rate, regular rhythm, no murmurs  Abdomen - soft, nontender, nondistended, no organomegaly  Ext-peripheral pulses normal, no pedal edema  Neuro - no focal findings    Results for orders placed or performed in visit on 11/09/21   HCV RNA THANIA QUALITATIVE   Result Value Ref Range    Hepatitis C RNA, QL, THANIA Positive (A) Negative       Assessment/Plan:  Diagnoses and all orders for this visit:    Hypertension goal BP (blood pressure) < 130/80  -     hydroCHLOROthiazide (HYDRODIURIL) 25 mg tablet; Take 1 Tablet by mouth daily. , Normal, Disp-90 Tablet, R-1    Acute hepatitis C virus infection without hepatic coma  -     REFERRAL TO LIVER HEPATOLOGY      Patient Instructions   Blood pressure medication:    Lisinopril 40 mg: take 1 tab in morning    Amlodipine 10 mg: take 1 tab in morning    Hydrochlorothiazide 25 mg: take 1 tab in the morning    Follow-up and Dispositions    · Return in about 2 weeks (around 12/7/2021), or if symptoms worsen or fail to improve, for f/u BP.

## 2021-11-23 NOTE — PATIENT INSTRUCTIONS
Blood pressure medication:    Lisinopril 40 mg: take 1 tab in morning    Amlodipine 10 mg: take 1 tab in morning    Hydrochlorothiazide 25 mg: take 1 tab in the morning

## 2021-11-23 NOTE — LETTER
11/23/2021    . [address] [facility] [address]    Dear Sharon Screws:    Please find your most recent results below. Resulted Orders   HCV RNA THANIA QUALITATIVE   Result Value Ref Range    Hepatitis C RNA, QL, THANIA Positive (A) Negative       RECOMMENDATIONS:  Hep C infection is confirmed.  A referral to liver doctor    Please call me if you have any questions: [phone]    Sincerely,      Danny Farias MD

## 2021-12-07 ENCOUNTER — OFFICE VISIT (OUTPATIENT)
Dept: FAMILY MEDICINE CLINIC | Age: 61
End: 2021-12-07
Payer: COMMERCIAL

## 2021-12-07 VITALS
HEIGHT: 74 IN | DIASTOLIC BLOOD PRESSURE: 100 MMHG | SYSTOLIC BLOOD PRESSURE: 170 MMHG | OXYGEN SATURATION: 96 % | RESPIRATION RATE: 20 BRPM | TEMPERATURE: 98.6 F | WEIGHT: 203 LBS | HEART RATE: 63 BPM | BODY MASS INDEX: 26.05 KG/M2

## 2021-12-07 DIAGNOSIS — I10 HYPERTENSION GOAL BP (BLOOD PRESSURE) < 130/80: Primary | ICD-10-CM

## 2021-12-07 PROCEDURE — 99213 OFFICE O/P EST LOW 20 MIN: CPT | Performed by: INTERNAL MEDICINE

## 2021-12-07 RX ORDER — VALSARTAN 320 MG/1
320 TABLET ORAL DAILY
Qty: 30 TABLET | Refills: 0 | Status: SHIPPED | OUTPATIENT
Start: 2021-12-07 | End: 2022-01-14

## 2021-12-07 NOTE — PROGRESS NOTES
Chief Complaint   Patient presents with    Hypertension     HPI:  Milo Willis is a 62 y.o. AA male presents to follow up on hypertension  Blood pressure is not controlled on lisinopril 40 mg, Amlodipine 10 mg, hydrochlorothiazide 25 mg. Denies headaches, dizziness, lightheadedness. Plan: switch lisinopril to valsartan and continue the rest of antihypertensive. Review of Systems  As per hpi    Past Medical History:   Diagnosis Date    Hypercholesterolemia     Hypertension      Past Surgical History:   Procedure Laterality Date    IR CHOLECYSTOSTOMY PERCUTANEOUS       Social History     Socioeconomic History    Marital status:    Tobacco Use    Smoking status: Current Every Day Smoker    Smokeless tobacco: Never Used   Substance and Sexual Activity    Alcohol use: Yes     Comment: social    Drug use: Yes     Types: Marijuana    Sexual activity: Yes     Partners: Female     Family History   Problem Relation Age of Onset    Diabetes Mother      Current Outpatient Medications   Medication Sig Dispense Refill    hydroCHLOROthiazide (HYDRODIURIL) 25 mg tablet Take 1 Tablet by mouth daily. 90 Tablet 1    cholecalciferol (VITAMIN D3) (2,000 UNITS /50 MCG) cap capsule Take 1 Capsule by mouth daily. 90 Capsule 1    lisinopriL (PRINIVIL, ZESTRIL) 40 mg tablet Take 1 Tablet by mouth daily. 30 Tablet 3    amLODIPine (NORVASC) 10 mg tablet Take 1 Tablet by mouth daily. 30 Tablet 3    acetaminophen (TYLENOL) 325 mg tablet Take 2 Tabs by mouth every four (4) hours as needed for Pain.  20 Tab 0     No Known Allergies    Objective:  Visit Vitals  BP (!) 163/104   Pulse 63   Temp 98.6 °F (37 °C) (Temporal)   Resp 20   Ht 6' 2\" (1.88 m)   Wt 203 lb (92.1 kg)   SpO2 96%   BMI 26.06 kg/m²     Physical Exam:   General appearance - alert, well appearing in no distress  Mental status - alert, oriented to person, place, and time  Chest - clear to auscultation, no wheezes, rales or rhonchi  Heart - normal rate, regular rhythm  Abdomen - soft, nontender, nondistended, no organomegaly  Ext-peripheral pulses normal, no pedal edema  Neuro - no focal findings     Results for orders placed or performed in visit on 11/09/21   HCV RNA THANIA QUALITATIVE   Result Value Ref Range    Hepatitis C RNA, QL, THANIA Positive (A) Negative       Assessment/Plan:  Diagnoses and all orders for this visit:    Hypertension goal BP (blood pressure) < 130/80  -     valsartan (DIOVAN) 320 mg tablet; Take 1 Tablet by mouth daily. , Normal, Disp-30 Tablet, R-0      Patient Instructions   Blood pressure medication:    Valsartan 320 mg: take 1 tab in morning    Amlodipine 10 mg: take 1 tab in morning    Hydrochlorothiazide 25 mg: take 1 tab in the morning    Follow-up and Dispositions    · Return in about 2 weeks (around 12/21/2021) for f/u blood pressure check.

## 2021-12-07 NOTE — PATIENT INSTRUCTIONS
Blood pressure medication:    Valsartan 320 mg: take 1 tab in morning    Amlodipine 10 mg: take 1 tab in morning    Hydrochlorothiazide 25 mg: take 1 tab in the morning

## 2022-01-12 DIAGNOSIS — I10 HYPERTENSION GOAL BP (BLOOD PRESSURE) < 130/80: ICD-10-CM

## 2022-01-14 RX ORDER — VALSARTAN 320 MG/1
320 TABLET ORAL DAILY
Qty: 30 TABLET | Refills: 0 | OUTPATIENT
Start: 2022-01-14

## 2022-01-14 RX ORDER — VALSARTAN 320 MG/1
320 TABLET ORAL DAILY
Qty: 30 TABLET | Refills: 3 | Status: SHIPPED | OUTPATIENT
Start: 2022-01-14 | End: 2022-02-16 | Stop reason: ALTCHOICE

## 2022-02-16 ENCOUNTER — OFFICE VISIT (OUTPATIENT)
Dept: FAMILY MEDICINE CLINIC | Age: 62
End: 2022-02-16
Payer: COMMERCIAL

## 2022-02-16 VITALS
OXYGEN SATURATION: 99 % | SYSTOLIC BLOOD PRESSURE: 171 MMHG | DIASTOLIC BLOOD PRESSURE: 95 MMHG | BODY MASS INDEX: 26.31 KG/M2 | TEMPERATURE: 98.7 F | HEART RATE: 67 BPM | HEIGHT: 74 IN | WEIGHT: 205 LBS | RESPIRATION RATE: 20 BRPM

## 2022-02-16 DIAGNOSIS — E55.9 VITAMIN D DEFICIENCY: ICD-10-CM

## 2022-02-16 DIAGNOSIS — Z12.11 COLON CANCER SCREENING: ICD-10-CM

## 2022-02-16 DIAGNOSIS — I10 HYPERTENSION GOAL BP (BLOOD PRESSURE) < 130/80: Primary | ICD-10-CM

## 2022-02-16 DIAGNOSIS — R76.8 HEPATITIS C ANTIBODY TEST POSITIVE: ICD-10-CM

## 2022-02-16 PROCEDURE — 99214 OFFICE O/P EST MOD 30 MIN: CPT | Performed by: INTERNAL MEDICINE

## 2022-02-16 RX ORDER — AMLODIPINE BESYLATE 10 MG/1
10 TABLET ORAL DAILY
Qty: 30 TABLET | Refills: 3 | Status: SHIPPED | OUTPATIENT
Start: 2022-02-16 | End: 2022-06-20 | Stop reason: SDUPTHER

## 2022-02-16 RX ORDER — ACETAMINOPHEN 500 MG
2000 TABLET ORAL DAILY
Qty: 90 CAPSULE | Refills: 1 | Status: SHIPPED | OUTPATIENT
Start: 2022-02-16

## 2022-02-16 RX ORDER — VALSARTAN AND HYDROCHLOROTHIAZIDE 320; 25 MG/1; MG/1
1 TABLET, FILM COATED ORAL DAILY
Qty: 90 TABLET | Refills: 1 | Status: SHIPPED | OUTPATIENT
Start: 2022-02-16 | End: 2022-06-20 | Stop reason: SDUPTHER

## 2022-02-16 NOTE — PROGRESS NOTES
Chief Complaint   Patient presents with    Blood Pressure Check     HPI:  Ana Durán is a 65 y.o. AA male with h/o hypertension, hypercholesterolemia presents for follow up on hypertension. Patient has been skipping his antihypertensive according spouse. Blood pressure today is elevated. Denies headaches, dizziness, chest pain. He is non compliant with treatment. Advised the importance of being compliant with treatment. Review of Systems  As per hpi    Past Medical History:   Diagnosis Date    Hypercholesterolemia     Hypertension      Past Surgical History:   Procedure Laterality Date    IR CHOLECYSTOSTOMY PERCUTANEOUS       Social History     Socioeconomic History    Marital status:    Tobacco Use    Smoking status: Current Every Day Smoker    Smokeless tobacco: Never Used   Substance and Sexual Activity    Alcohol use: Yes     Comment: social    Drug use: Yes     Types: Marijuana    Sexual activity: Yes     Partners: Female     Family History   Problem Relation Age of Onset    Diabetes Mother      Current Outpatient Medications   Medication Sig Dispense Refill    valsartan (DIOVAN) 320 mg tablet TAKE 1 TABLET BY MOUTH DAILY 30 Tablet 3    hydroCHLOROthiazide (HYDRODIURIL) 25 mg tablet Take 1 Tablet by mouth daily. 90 Tablet 1    cholecalciferol (VITAMIN D3) (2,000 UNITS /50 MCG) cap capsule Take 1 Capsule by mouth daily. 90 Capsule 1    amLODIPine (NORVASC) 10 mg tablet Take 1 Tablet by mouth daily. 30 Tablet 3    acetaminophen (TYLENOL) 325 mg tablet Take 2 Tabs by mouth every four (4) hours as needed for Pain.  20 Tab 0     No Known Allergies    Objective:  Visit Vitals  BP (!) 171/95   Pulse 67   Temp 98.7 °F (37.1 °C)   Resp 20   Ht 6' 2\" (1.88 m)   Wt 205 lb (93 kg)   SpO2 99%   BMI 26.32 kg/m²     Physical Exam:   General appearance - alert, well appearing in no distress  Mental status - alert, oriented to person, place, and time  Chest - clear to auscultation, no wheezes, rales or rhonchi  Heart - normal rate, regular rhythm, no murmurs  Abdomen - soft, nontender, nondistended, no organomegaly  Ext-peripheral pulses normal, no pedal edema    Results for orders placed or performed in visit on 11/09/21   HCV RNA THANIA QUALITATIVE   Result Value Ref Range    Hepatitis C RNA, QL, THANIA Positive (A) Negative       Assessment/Plan:  Diagnoses and all orders for this visit:    Hypertension goal BP (blood pressure) < 924/70  -     METABOLIC PANEL, COMPREHENSIVE; Future  -     amLODIPine (NORVASC) 10 mg tablet; Take 1 Tablet by mouth daily. , Normal, Disp-30 Tablet, R-3  -     valsartan-hydroCHLOROthiazide (DIOVAN-HCT) 320-25 mg per tablet; Take 1 Tablet by mouth daily. , Normal, Disp-90 Tablet, R-1    Vitamin D deficiency  -     VITAMIN D, 25 HYDROXY; Future  -     cholecalciferol (VITAMIN D3) (2,000 UNITS /50 MCG) cap capsule; Take 1 Capsule by mouth daily. , Normal, Disp-90 Capsule, R-1    Hepatitis C antibody test positive    Colon cancer screening  -     REFERRAL TO GASTROENTEROLOGY    Other orders  -     Cancel: INFLUENZA VIRUS VAC QUAD,SPLIT,PRESV FREE SYRINGE IM      Patient Instructions        DASH Diet: Care Instructions  Your Care Instructions     The DASH diet is an eating plan that can help lower your blood pressure. DASH stands for Dietary Approaches to Stop Hypertension. Hypertension is high blood pressure. The DASH diet focuses on eating foods that are high in calcium, potassium, and magnesium. These nutrients can lower blood pressure. The foods that are highest in these nutrients are fruits, vegetables, low-fat dairy products, nuts, seeds, and legumes. But taking calcium, potassium, and magnesium supplements instead of eating foods that are high in those nutrients does not have the same effect. The DASH diet also includes whole grains, fish, and poultry. The DASH diet is one of several lifestyle changes your doctor may recommend to lower your high blood pressure.  Your doctor may also want you to decrease the amount of sodium in your diet. Lowering sodium while following the DASH diet can lower blood pressure even further than just the DASH diet alone. Follow-up care is a key part of your treatment and safety. Be sure to make and go to all appointments, and call your doctor if you are having problems. It's also a good idea to know your test results and keep a list of the medicines you take. How can you care for yourself at home? Following the DASH diet  · Eat 4 to 5 servings of fruit each day. A serving is 1 medium-sized piece of fruit, ½ cup chopped or canned fruit, 1/4 cup dried fruit, or 4 ounces (½ cup) of fruit juice. Choose fruit more often than fruit juice. · Eat 4 to 5 servings of vegetables each day. A serving is 1 cup of lettuce or raw leafy vegetables, ½ cup of chopped or cooked vegetables, or 4 ounces (½ cup) of vegetable juice. Choose vegetables more often than vegetable juice. · Get 2 to 3 servings of low-fat and fat-free dairy each day. A serving is 8 ounces of milk, 1 cup of yogurt, or 1 ½ ounces of cheese. · Eat 6 to 8 servings of grains each day. A serving is 1 slice of bread, 1 ounce of dry cereal, or ½ cup of cooked rice, pasta, or cooked cereal. Try to choose whole-grain products as much as possible. · Limit lean meat, poultry, and fish to 2 servings each day. A serving is 3 ounces, about the size of a deck of cards. · Eat 4 to 5 servings of nuts, seeds, and legumes (cooked dried beans, lentils, and split peas) each week. A serving is 1/3 cup of nuts, 2 tablespoons of seeds, or ½ cup of cooked beans or peas. · Limit fats and oils to 2 to 3 servings each day. A serving is 1 teaspoon of vegetable oil or 2 tablespoons of salad dressing. · Limit sweets and added sugars to 5 servings or less a week. A serving is 1 tablespoon jelly or jam, ½ cup sorbet, or 1 cup of lemonade. · Eat less than 2,300 milligrams (mg) of sodium a day.  If you limit your sodium to 1,500 mg a day, you can lower your blood pressure even more. · Be aware that all of these are the suggested number of servings for people who eat 1,800 to 2,000 calories a day. Your recommended number of servings may be different if you need more or fewer calories. Tips for success  · Start small. Do not try to make dramatic changes to your diet all at once. You might feel that you are missing out on your favorite foods and then be more likely to not follow the plan. Make small changes, and stick with them. Once those changes become habit, add a few more changes. · Try some of the following:  ? Make it a goal to eat a fruit or vegetable at every meal and at snacks. This will make it easy to get the recommended amount of fruits and vegetables each day. ? Try yogurt topped with fruit and nuts for a snack or healthy dessert. ? Add lettuce, tomato, cucumber, and onion to sandwiches. ? Combine a ready-made pizza crust with low-fat mozzarella cheese and lots of vegetable toppings. Try using tomatoes, squash, spinach, broccoli, carrots, cauliflower, and onions. ? Have a variety of cut-up vegetables with a low-fat dip as an appetizer instead of chips and dip. ? Sprinkle sunflower seeds or chopped almonds over salads. Or try adding chopped walnuts or almonds to cooked vegetables. ? Try some vegetarian meals using beans and peas. Add garbanzo or kidney beans to salads. Make burritos and tacos with mashed vidales beans or black beans. Where can you learn more? Go to http://www.giles.com/  Enter H967 in the search box to learn more about \"DASH Diet: Care Instructions. \"  Current as of: April 29, 2021               Content Version: 13.0  © 6500-0199 [employer]. Care instructions adapted under license by [employer] (which disclaims liability or warranty for this information).  If you have questions about a medical condition or this instruction, always ask your healthcare professional. [address] any warranty or liability for your use of this information. Follow-up and Dispositions    · Return in about 2 weeks (around 3/2/2022) for follow up blood pressure.

## 2022-02-16 NOTE — PATIENT INSTRUCTIONS
DASH Diet: Care Instructions  Your Care Instructions     The DASH diet is an eating plan that can help lower your blood pressure. DASH stands for Dietary Approaches to Stop Hypertension. Hypertension is high blood pressure. The DASH diet focuses on eating foods that are high in calcium, potassium, and magnesium. These nutrients can lower blood pressure. The foods that are highest in these nutrients are fruits, vegetables, low-fat dairy products, nuts, seeds, and legumes. But taking calcium, potassium, and magnesium supplements instead of eating foods that are high in those nutrients does not have the same effect. The DASH diet also includes whole grains, fish, and poultry. The DASH diet is one of several lifestyle changes your doctor may recommend to lower your high blood pressure. Your doctor may also want you to decrease the amount of sodium in your diet. Lowering sodium while following the DASH diet can lower blood pressure even further than just the DASH diet alone. Follow-up care is a key part of your treatment and safety. Be sure to make and go to all appointments, and call your doctor if you are having problems. It's also a good idea to know your test results and keep a list of the medicines you take. How can you care for yourself at home? Following the DASH diet  · Eat 4 to 5 servings of fruit each day. A serving is 1 medium-sized piece of fruit, ½ cup chopped or canned fruit, 1/4 cup dried fruit, or 4 ounces (½ cup) of fruit juice. Choose fruit more often than fruit juice. · Eat 4 to 5 servings of vegetables each day. A serving is 1 cup of lettuce or raw leafy vegetables, ½ cup of chopped or cooked vegetables, or 4 ounces (½ cup) of vegetable juice. Choose vegetables more often than vegetable juice. · Get 2 to 3 servings of low-fat and fat-free dairy each day. A serving is 8 ounces of milk, 1 cup of yogurt, or 1 ½ ounces of cheese. · Eat 6 to 8 servings of grains each day.  A serving is 1 slice of bread, 1 ounce of dry cereal, or ½ cup of cooked rice, pasta, or cooked cereal. Try to choose whole-grain products as much as possible. · Limit lean meat, poultry, and fish to 2 servings each day. A serving is 3 ounces, about the size of a deck of cards. · Eat 4 to 5 servings of nuts, seeds, and legumes (cooked dried beans, lentils, and split peas) each week. A serving is 1/3 cup of nuts, 2 tablespoons of seeds, or ½ cup of cooked beans or peas. · Limit fats and oils to 2 to 3 servings each day. A serving is 1 teaspoon of vegetable oil or 2 tablespoons of salad dressing. · Limit sweets and added sugars to 5 servings or less a week. A serving is 1 tablespoon jelly or jam, ½ cup sorbet, or 1 cup of lemonade. · Eat less than 2,300 milligrams (mg) of sodium a day. If you limit your sodium to 1,500 mg a day, you can lower your blood pressure even more. · Be aware that all of these are the suggested number of servings for people who eat 1,800 to 2,000 calories a day. Your recommended number of servings may be different if you need more or fewer calories. Tips for success  · Start small. Do not try to make dramatic changes to your diet all at once. You might feel that you are missing out on your favorite foods and then be more likely to not follow the plan. Make small changes, and stick with them. Once those changes become habit, add a few more changes. · Try some of the following:  ? Make it a goal to eat a fruit or vegetable at every meal and at snacks. This will make it easy to get the recommended amount of fruits and vegetables each day. ? Try yogurt topped with fruit and nuts for a snack or healthy dessert. ? Add lettuce, tomato, cucumber, and onion to sandwiches. ? Combine a ready-made pizza crust with low-fat mozzarella cheese and lots of vegetable toppings. Try using tomatoes, squash, spinach, broccoli, carrots, cauliflower, and onions. ?  Have a variety of cut-up vegetables with a low-fat dip as an appetizer instead of chips and dip. ? Sprinkle sunflower seeds or chopped almonds over salads. Or try adding chopped walnuts or almonds to cooked vegetables. ? Try some vegetarian meals using beans and peas. Add garbanzo or kidney beans to salads. Make burritos and tacos with mashed vidales beans or black beans. Where can you learn more? Go to http://www.giles.com/  Enter H967 in the search box to learn more about \"DASH Diet: Care Instructions. \"  Current as of: April 29, 2021               Content Version: 13.0  © 1139-1985 [employer]. Care instructions adapted under license by [employer] (which disclaims liability or warranty for this information). If you have questions about a medical condition or this instruction, always ask your healthcare professional. [address] any warranty or liability for your use of this information.

## 2022-03-04 ENCOUNTER — OFFICE VISIT (OUTPATIENT)
Dept: FAMILY MEDICINE CLINIC | Age: 62
End: 2022-03-04
Payer: COMMERCIAL

## 2022-03-04 VITALS
HEART RATE: 68 BPM | RESPIRATION RATE: 20 BRPM | WEIGHT: 205 LBS | HEIGHT: 74 IN | SYSTOLIC BLOOD PRESSURE: 156 MMHG | BODY MASS INDEX: 26.31 KG/M2 | DIASTOLIC BLOOD PRESSURE: 97 MMHG | OXYGEN SATURATION: 99 % | TEMPERATURE: 97.6 F

## 2022-03-04 DIAGNOSIS — I10 HYPERTENSION GOAL BP (BLOOD PRESSURE) < 130/80: Primary | ICD-10-CM

## 2022-03-04 DIAGNOSIS — Z12.11 COLON CANCER SCREENING: ICD-10-CM

## 2022-03-04 PROCEDURE — 99214 OFFICE O/P EST MOD 30 MIN: CPT | Performed by: INTERNAL MEDICINE

## 2022-03-04 RX ORDER — HYDRALAZINE HYDROCHLORIDE 50 MG/1
50 TABLET, FILM COATED ORAL 2 TIMES DAILY
Qty: 60 TABLET | Refills: 3 | Status: SHIPPED | OUTPATIENT
Start: 2022-03-04 | End: 2022-03-24 | Stop reason: SDUPTHER

## 2022-03-04 NOTE — PATIENT INSTRUCTIONS
Hydralazine (Apresoline) - (By mouth)   Why this medicine is used:   Treats high blood pressure. Contact a nurse or doctor right away if you have:  · Chest pain that may spread to your arms, jaw, back, or neck, trouble breathing  · Dark urine or pale stools  · Pain in your upper stomach, yellow skin or eyes  · Lightheadedness, dizziness, fainting, unusual sweating  · Numbness, tingling, or burning pain in your hands, arms, legs, or feet     Common side effects:  · Diarrhea, nausea, vomiting, loss of appetite  · Headache  © 2017 [address] is for End User's use only and may not be sold, redistributed or otherwise used for commercial purposes.

## 2022-03-04 NOTE — PROGRESS NOTES
Chief Complaint   Patient presents with    Blood Pressure Check     Need referral for Colon to go to [facility] test - due to insurance fax [phone]     HPI:  Yohan Grant is a 65 y.o. AA male presents for blood pressure check. Reading today is still not controlled on amlodipine 10 mg and valsartan/HCTZ 320/25mg. Denies headache, lightheadedness, shortness of breath. Also he is asking for a referral for Colon cancer screening send to go to [facility] test - due to insurance fax [phone]    Review of Systems  As per hpi    Past Medical History:   Diagnosis Date    Hypercholesterolemia     Hypertension      Past Surgical History:   Procedure Laterality Date    IR CHOLECYSTOSTOMY PERCUTANEOUS       Social History     Socioeconomic History    Marital status:    Tobacco Use    Smoking status: Current Every Day Smoker    Smokeless tobacco: Never Used   Substance and Sexual Activity    Alcohol use: Yes     Comment: social    Drug use: Yes     Types: Marijuana    Sexual activity: Yes     Partners: Female     Family History   Problem Relation Age of Onset    Diabetes Mother      Current Outpatient Medications   Medication Sig Dispense Refill    cholecalciferol (VITAMIN D3) (2,000 UNITS /50 MCG) cap capsule Take 1 Capsule by mouth daily. 90 Capsule 1    amLODIPine (NORVASC) 10 mg tablet Take 1 Tablet by mouth daily. 30 Tablet 3    valsartan-hydroCHLOROthiazide (DIOVAN-HCT) 320-25 mg per tablet Take 1 Tablet by mouth daily. 90 Tablet 1    acetaminophen (TYLENOL) 325 mg tablet Take 2 Tabs by mouth every four (4) hours as needed for Pain.  20 Tab 0     No Known Allergies    Objective:  Visit Vitals  BP (!) 156/97   Pulse 68   Temp 97.6 °F (36.4 °C) (Temporal)   Resp 20   Ht 6' 2\" (1.88 m)   Wt 205 lb (93 kg)   SpO2 99%   BMI 26.32 kg/m²     Physical Exam:   General appearance - alert, well appearing in no distress  Mental status - alert, oriented to person, place, and time  Chest - clear to auscultation, no wheezes, rales or rhonchi  Heart - normal rate, regular rhythm no murmurs  Abdomen - soft, nontender, nondistended, no organomegaly  Ext-peripheral pulses normal, no pedal edema  Neuro -no focal findings  Back-full range of motion, no tenderness, palpable spasm or pain on motion     Results for orders placed or performed in visit on 02/16/22   VITAMIN D, 25 HYDROXY   Result Value Ref Range    Vitamin D 25-Hydroxy 43.5 30 - 876 ng/mL   METABOLIC PANEL, COMPREHENSIVE   Result Value Ref Range    Sodium 137 136 - 145 mmol/L    Potassium 4.7 3.5 - 5.1 mmol/L    Chloride 104 97 - 108 mmol/L    CO2 28 21 - 32 mmol/L    Anion gap 5 5 - 15 mmol/L    Glucose 110 (H) 65 - 100 mg/dL    BUN 17 6 - 20 MG/DL    Creatinine 1.43 (H) 0.70 - 1.30 MG/DL    BUN/Creatinine ratio 12 12 - 20      GFR est AA >60 >60 ml/min/1.73m2    GFR est non-AA 50 (L) >60 ml/min/1.73m2    Calcium 9.7 8.5 - 10.1 MG/DL    Bilirubin, total 0.4 0.2 - 1.0 MG/DL    ALT (SGPT) 33 12 - 78 U/L    AST (SGOT) 26 15 - 37 U/L    Alk. phosphatase 79 45 - 117 U/L    Protein, total 7.5 6.4 - 8.2 g/dL    Albumin 3.7 3.5 - 5.0 g/dL    Globulin 3.8 2.0 - 4.0 g/dL    A-G Ratio 1.0 (L) 1.1 - 2.2       Assessment/Plan:  Diagnoses and all orders for this visit:    Hypertension goal BP (blood pressure) < 130/80  -     hydrALAZINE (APRESOLINE) 50 mg tablet; Take 1 Tablet by mouth two (2) times a day., Normal, Disp-60 Tablet, R-3    Colon cancer screening  -     REFERRAL TO GASTROENTEROLOGY      Patient Instructions      Hydralazine (Apresoline) - (By mouth)   Why this medicine is used:   Treats high blood pressure.   Contact a nurse or doctor right away if you have:  · Chest pain that may spread to your arms, jaw, back, or neck, trouble breathing  · Dark urine or pale stools  · Pain in your upper stomach, yellow skin or eyes  · Lightheadedness, dizziness, fainting, unusual sweating  · Numbness, tingling, or burning pain in your hands, arms, legs, or feet     Common side effects:  · Diarrhea, nausea, vomiting, loss of appetite  · Headache  © 2017 [facility] Information is for End User's use only and may not be sold, redistributed or otherwise used for commercial purposes. Follow-up and Dispositions    · Return in about 2 weeks (around 3/18/2022), or if symptoms worsen or fail to improve, for f/u blood pressure.

## 2022-03-10 ENCOUNTER — TELEPHONE (OUTPATIENT)
Dept: FAMILY MEDICINE CLINIC | Age: 62
End: 2022-03-10

## 2022-03-10 DIAGNOSIS — Z12.11 COLON CANCER SCREENING: Primary | ICD-10-CM

## 2022-03-10 NOTE — TELEPHONE ENCOUNTER
Referral To Gastroenterology Dr. Yasmin Cordova does not take the patient insurance. Referral needed to be changed.

## 2022-03-17 ENCOUNTER — OFFICE VISIT (OUTPATIENT)
Dept: HEMATOLOGY | Age: 62
End: 2022-03-17
Payer: COMMERCIAL

## 2022-03-17 VITALS
WEIGHT: 208.2 LBS | SYSTOLIC BLOOD PRESSURE: 150 MMHG | RESPIRATION RATE: 17 BRPM | TEMPERATURE: 96.7 F | HEART RATE: 75 BPM | DIASTOLIC BLOOD PRESSURE: 87 MMHG | HEIGHT: 74 IN | OXYGEN SATURATION: 99 % | BODY MASS INDEX: 26.72 KG/M2

## 2022-03-17 DIAGNOSIS — B18.2 CHRONIC HEPATITIS C WITHOUT HEPATIC COMA (HCC): ICD-10-CM

## 2022-03-17 DIAGNOSIS — R76.8 HEPATITIS C ANTIBODY POSITIVE IN BLOOD: Primary | ICD-10-CM

## 2022-03-17 PROCEDURE — 99203 OFFICE O/P NEW LOW 30 MIN: CPT | Performed by: INTERNAL MEDICINE

## 2022-03-17 NOTE — Clinical Note
4/17/2022    Patient: Solitario Garcia   YOB: 1960   Date of Visit: 3/17/2022       No Recipients    Dear   No Recipients,      Thank you for referring Mr. Solitario Garcia to [address] for evaluation. My notes for this consultation are attached. If you have questions, please do not hesitate to call me. I look forward to following your patient along with you.       Sincerely,    Jeffrey Mcgovern MD

## 2022-03-17 NOTE — PROGRESS NOTES
[facility], Dragan ROSE, Izzy Jesielaine Stevens, [place]      Juan Ramon Baca, MICAH Day, ACNP-BC     April S Renuka, [facility]   Sydnie Amin, EN-CIARA [facility], [facility]       Kana Eyadutado [address]    at [address], [facility] Ryan Siegel  22.    [phone]    FAX: [address] [place] [address]    at [address], [address]    [phone]    FAX: [phone]       Patient Care Team:  Claire Chamorro MD as PCP - General (Internal Medicine)  Claire Chamorro MD as PCP - [facility] Provider      Problem List  Date Reviewed: 3/17/2022          Codes Class Noted    Hypertension ICD-10-CM: I10  ICD-9-CM: 401.9  7/28/2012        History of cholecystectomy ICD-10-CM: Z90.49  ICD-9-CM: V45.79  7/28/2012                The clinicians listed above have asked me to see [facility] in consultation regarding chronic HCV and its management. All medical records sent by the referring physicians were reviewed     The patient is a 65 y.o. Black male who was screened for anti-HCV and tested positive in 11/2021. Risk factors for acquiring HCV are IV drug use, in 1990s. There was no history of acute icteric hepatitis at the time of these risk factors. Imaging of the liver was not performed. An assessment of liver fibrosis with biopsy or elastography has not been performed. The patient has never received treatment for chronic HCV. The patient does not have any symptoms which could be attributed to the liver disorder.     The patient is not experiencing the following symptoms which are commonly seen in this liver disorder:   fatigue,   pain in the right side over the liver,     The patient completes all daily activities without any functional limitations. ASSESSMENT AND PLAN:  Chronic HCV   Chronic HCV of unclear severity. Have performed laboratory testing to monitor liver function and degree of liver injury. This included BMP, hepatic panel, CBC with platelet count,     Liver transaminases are normal.  ALP is normal.  Liver function is normal.  The platelet count is normal.      Will perform and/or review results of HCV viral load, HCV genotype to define the specific treatment and duration of treatment that will be required. Will perform  serologic and virologic studies to assess for other causes of chronic liver disease. Now that he has tested HCV RNA positive will get genotype. Will perform imaging of the liver with ultrasound. The need to perform an assessment of liver fibrosis was discussed with the patient. The Fibroscan can assess liver fibrosis and determine if a patient has advanced fibrosis or cirrhosis without the need for liver biopsy. This will be performed at the next office visit. Chronic HCV Treatment  The patient has not been treated for HCV. The patient has HCV genotype that is not yet defined. Discussed the treatment alternatives. The SVR/cure rate for HCV now exceeds 97% without significant side effects for most patients with HCV. The specific treatment is dependent upon genotype, viral load and histology. Screening for Hepatocellular Carcinoma  HCC screening is not necessary if the patient has no evidence of cirrhosis. Treatment of other medical problems in patients with chronic liver disease  There are no contraindications for the patient to take most medications that are necessary for treatment of other medical issues. Counseling for alcohol in patients with chronic liver disease  The patient was counseled regarding alcohol consumption and the effect of alcohol on chronic liver disease. The patient does not consume any significant amount of alcohol.     Substance Use  The patient has not used drugs since . Vaccinations   The need for vaccination against viral hepatitis A and B will be assessed with serologic and instituted as appropriate. The patient has received 2 doses of COVID-19 vaccine. Routine vaccinations against other bacterial and viral agents can be performed as indicated. Annual flu vaccination should be administered if indicated. ALLERGIES  No Known Allergies    MEDICATIONS  Current Outpatient Medications   Medication Sig    hydrALAZINE (APRESOLINE) 50 mg tablet Take 1 Tablet by mouth two (2) times a day.  cholecalciferol (VITAMIN D3) (2,000 UNITS /50 MCG) cap capsule Take 1 Capsule by mouth daily.  amLODIPine (NORVASC) 10 mg tablet Take 1 Tablet by mouth daily.  valsartan-hydroCHLOROthiazide (DIOVAN-HCT) 320-25 mg per tablet Take 1 Tablet by mouth daily.  acetaminophen (TYLENOL) 325 mg tablet Take 2 Tabs by mouth every four (4) hours as needed for Pain. (Patient not taking: Reported on 3/17/2022)     No current facility-administered medications for this visit. SYSTEM REVIEW NOT RELATED TO LIVER DISEASE OR REVIEWED ABOVE:  Constitution systems: Negative for fever, chills, weight gain, weight loss. Eyes: Negative for visual changes. ENT: Negative for sore throat, painful swallowing. Respiratory: Negative for cough, hemoptysis, SOB. Cardiology: Negative for chest pain, palpitations. GI:  Negative for constipation or diarrhea. : Negative for urinary frequency, dysuria, hematuria, nocturia. Skin: Negative for rash. Hematology: Negative for easy bruising, blood clots. Musculo-skelatal: Negative for back pain, muscle pain, weakness. Neurologic: Negative for headaches, dizziness, vertigo, memory problems not related to HE. Psychology: Negative for anxiety, depression. FAMILY HISTORY:  The father  of cancer. The mother Has/had the following chronic disease(s): DM, HTN. There is no family history of liver disease. SOCIAL HISTORY:  The patient is . The patient has 3 children, and 6 grandchildren. The patient currently smokes 7 cigarettes per day. The patient consumes 3 alcoholic beverages per week. The patient currently works full time [employer]. PHYSICAL EXAMINATION:  Visit Vitals  BP (!) 150/87   Pulse 75   Temp (!) 96.7 °F (35.9 °C) (Temporal)   Resp 17   Ht 6' 2\" (1.88 m)   Wt 208 lb 3.2 oz (94.4 kg)   SpO2 99%   BMI 26.73 kg/m²     General: No acute distress. Eyes: Sclera anicteric. ENT: No oral lesions. Thyroid normal.  Nodes: No adenopathy. Skin: No spider angiomata. No jaundice. No palmar erythema. Respiratory: Lungs clear to auscultation. Cardiovascular: Regular heart rate. No murmurs. No JVD. Abdomen: Soft non-tender. Liver size normal to percussion/palpation. Spleen not palpable. No obvious ascites. Extremities: No edema. No muscle wasting. No gross arthritic changes. Neurologic: Alert and oriented. Cranial nerves grossly intact. No asterixis.     LABORATORY STUDIES:  Liver [place] [address] & Units 2/16/2022 11/2/2021   WBC 4.1 - 11.1 K/uL  4.2   ANC 1.8 - 8.0 K/UL     HGB 12.1 - 17.0 g/dL  13.9    - 400 K/uL  277   AST 15 - 37 U/L 26 27   ALT 12 - 78 U/L 33 37   Alk Phos 45 - 117 U/L 79 67   Bili, Total 0.2 - 1.0 MG/DL 0.4 0.4   Albumin 3.5 - 5.0 g/dL 3.7 3.6   BUN 6 - 20 MG/DL 17 17   Creat 0.70 - 1.30 MG/DL 1.43 (H) 1.37 (H)   Na 136 - 145 mmol/L 137 137   K 3.5 - 5.1 mmol/L 4.7 4.4   Cl 97 - 108 mmol/L 104 108   CO2 21 - 32 mmol/L 28 28   Glucose 65 - 100 mg/dL 110 (H) 107 (H)     SEROLOGIES:  Serologies Latest Ref Rng & Units 3/17/2022 11/2/2021   Hep C Ab NONREACTIVE    REACTIVE (A)   HCV RT-PCR, Quant IU/mL 2,220,000      LIVER HISTOLOGY:  Not available or performed    ENDOSCOPIC PROCEDURES:  Not available or performed    RADIOLOGY:  Not available or performed    OTHER TESTING:  Not available or performed    FOLLOW-UP:  All of the issues listed above in the Assessment and Plan were discussed with the patient. All questions were answered. The patient expressed a clear understanding of the above. [address] [place] [address] in 4 weeks for Fibroscan to review all data and determine the treatment plan.       MD Yusra [address] 22.  [phone]  [address] [place] [address]

## 2022-03-17 NOTE — PROGRESS NOTES
Identified pt with two pt identifiers(name and ). Reviewed record in preparation for visit and have obtained necessary documentation. Chief Complaint   Patient presents with    New Patient     Establish care    Hepatitis C      Vitals:    22 0812 22 0819   BP: (!) 158/87 (!) 150/87   Pulse: 75    Resp: 17    Temp: (!) 96.7 °F (35.9 °C)    TempSrc: Temporal    SpO2: 99%    Weight: 208 lb 3.2 oz (94.4 kg)    Height: 6' 2\" (1.88 m)    PainSc:   0 - No pain        Health Maintenance Review: Patient reminded of \"due or due soon\" health maintenance. I have asked the patient to contact his/her primary care provider (PCP) for follow-up on his/her health maintenance. Coordination of Care Questionnaire:  :   1) Have you been to an emergency room, urgent care, or hospitalized since your last visit? If yes, where when, and reason for visit? no       2. Have seen or consulted any other health care provider since your last visit? If yes, where when, and reason for visit? NO      Patient is accompanied by wife I have received verbal consent from [facility] to discuss any/all medical information while they are present in the room.

## 2022-03-17 NOTE — Clinical Note
NOTIFICATION RETURN TO WORK / SCHOOL    4/17/2022 8:26 PM    Mr. [address] [address]      To Whom It May Concern:    Grecia Claire is currently under the care of [address] [facility]. He will return to work/school on: ***    If there are questions or concerns please have the patient contact our office.         Sincerely,      Rell Chen MD

## 2022-03-23 LAB
HCV RNA SERPL NAA+PROBE-ACNC: NORMAL IU/ML
HCV RNA SERPL NAA+PROBE-LOG IU: 6.35 LOG10 IU/ML

## 2022-03-24 ENCOUNTER — OFFICE VISIT (OUTPATIENT)
Dept: FAMILY MEDICINE CLINIC | Age: 62
End: 2022-03-24
Payer: COMMERCIAL

## 2022-03-24 VITALS
HEIGHT: 74 IN | WEIGHT: 211 LBS | SYSTOLIC BLOOD PRESSURE: 147 MMHG | TEMPERATURE: 98.6 F | BODY MASS INDEX: 27.08 KG/M2 | OXYGEN SATURATION: 98 % | DIASTOLIC BLOOD PRESSURE: 91 MMHG | RESPIRATION RATE: 20 BRPM | HEART RATE: 74 BPM

## 2022-03-24 DIAGNOSIS — B17.10 ACUTE HEPATITIS C VIRUS INFECTION WITHOUT HEPATIC COMA: Primary | ICD-10-CM

## 2022-03-24 DIAGNOSIS — I10 HYPERTENSION GOAL BP (BLOOD PRESSURE) < 130/80: ICD-10-CM

## 2022-03-24 LAB — HCV RNA SERPL QL NAA+PROBE: POSITIVE

## 2022-03-24 PROCEDURE — 99214 OFFICE O/P EST MOD 30 MIN: CPT | Performed by: INTERNAL MEDICINE

## 2022-03-24 RX ORDER — HYDRALAZINE HYDROCHLORIDE 50 MG/1
TABLET, FILM COATED ORAL
Qty: 90 TABLET | Refills: 3 | Status: SHIPPED | OUTPATIENT
Start: 2022-03-24 | End: 2022-06-20 | Stop reason: SDUPTHER

## 2022-03-24 NOTE — PROGRESS NOTES
Chief Complaint   Patient presents with    Hypertension     follow up      HPI:  Darleen Hunter is a 65 y.o. AA male presents for blood pressure check. Reading today is still not controlled on amlodipine 10 mg and valsartan/HCTZ 320/25mg hydralazine 50 mg twice a day. Denies headache, lightheadedness, shortness of breath. Medication is adjusted as follows:. Amlodipine 10 mg, Valsartan-hctz 320-25 mg, Hydralazine 50 mtab in AM; 2 tabs at bedtime    Review of Systems  As per hpi    Past Medical History:   Diagnosis Date    Hepatitis C infection     Hypertension      Past Surgical History:   Procedure Laterality Date    IR CHOLECYSTOSTOMY PERCUTANEOUS       Social History     Socioeconomic History    Marital status:    Tobacco Use    Smoking status: Current Every Day Smoker     Packs/day: 0.50    Smokeless tobacco: Never Used   Vaping Use    Vaping Use: Never used   Substance and Sexual Activity    Alcohol use: Yes     Comment: social    Drug use: Yes     Types: Marijuana    Sexual activity: Yes     Partners: Female     Family History   Problem Relation Age of Onset    Diabetes Mother      Current Outpatient Medications   Medication Sig Dispense Refill    hydrALAZINE (APRESOLINE) 50 mg tablet Take 1 Tablet by mouth two (2) times a day. 60 Tablet 3    cholecalciferol (VITAMIN D3) (2,000 UNITS /50 MCG) cap capsule Take 1 Capsule by mouth daily. 90 Capsule 1    amLODIPine (NORVASC) 10 mg tablet Take 1 Tablet by mouth daily. 30 Tablet 3    valsartan-hydroCHLOROthiazide (DIOVAN-HCT) 320-25 mg per tablet Take 1 Tablet by mouth daily.  90 Tablet 1     No Known Allergies    Objective:  Visit Vitals  BP (!) 147/91   Pulse 74   Temp 98.6 °F (37 °C) (Temporal)   Resp 20   Ht 6' 2\" (1.88 m)   Wt 211 lb (95.7 kg)   SpO2 98%   BMI 27.09 kg/m²     Physical Exam:   General appearance - alert, well appearing, and in no distress  Mental status - alert, oriented to person, place, and time  Neck - supple, no significant adenopathy   Chest - clear to auscultation, no wheezes, rales or rhonchi  Heart - normal rate, regular rhythm, no murmurs  Abdomen - soft, nontender, nondistended, no organomegaly    Results for orders placed or performed in visit on 22   HCV RNA BY THANIA QL,RFLX TO QT   Result Value Ref Range    HCV RNA by THANIA, QL Positive (A) Negative     HEPATITIS C VIRUS REFLEX, QT   Result Value Ref Range    Hepatitis C Quantitation 2,220,000 IU/mL    Hepatitis C log 10 6.346 log10 IU/mL     Assessment/Plan:  Diagnoses and all orders for this visit:    Acute hepatitis C virus infection without hepatic coma    Hypertension goal BP (blood pressure) < 130/80  -     hydrALAZINE (APRESOLINE) 50 mg tablet; 1 tab in AM and 2 tabs at bedtime, Normal, Disp-90 Tablet, R-3      Patient Instructions   Morning medications:  Amlodipine 10 mg : 1 tab in AM  Valsartan-hctz 320-25 m tab in AM  Hydralazine 50 mtab in AM; 2 tabs at bedtime    Follow-up and Dispositions    · Return for routine follow up.

## 2022-03-24 NOTE — PATIENT INSTRUCTIONS
Morning medications:  Amlodipine 10 mg : 1 tab in AM  Valsartan-hctz 320-25 m tab in AM  Hydralazine 50 mtab in AM; 2 tabs at bedtime unable to respond

## 2022-04-19 ENCOUNTER — TELEPHONE (OUTPATIENT)
Dept: HEMATOLOGY | Age: 62
End: 2022-04-19

## 2022-04-19 PROBLEM — B18.2 CHRONIC HEPATITIS C (HCC): Status: ACTIVE | Noted: 2022-04-19

## 2022-04-19 NOTE — TELEPHONE ENCOUNTER
----- Message from Ryne Russell MD sent at 4/19/2022  1:21 PM EDT -----  Regarding: RE: Call and tell him he does have HCV virus.    ----- Message -----  From: Lalitha Blue RN  Sent: 4/19/2022  10:50 AM EDT  To: Ryne Russell MD  Subject: FW: Call and tell him he does have HCV virus.      ----- Message -----  From: Macho Dugan MD  Sent: 4/17/2022   8:28 PM EDT  To: Dick Kendall RN  Subject: Call and tell him he does have HCV virus. Have him get labs I ordered so he can start treatment at next appt. Have him get US. All this ordered. Schedule FU appt with NP in 4 weeks or NA. [email or web address] [email or web address] Spoke w/patient wife concerning message above from [facility]. Patient does have HCV and will need US/labs prior to next appt. Wife is on patient HIPPA form. Wife will call in schedule US and will route message to schedule appt. Wife verbalize understanding. (KF)

## 2022-04-22 ENCOUNTER — DOCUMENTATION ONLY (OUTPATIENT)
Dept: HEMATOLOGY | Age: 62
End: 2022-04-22

## 2022-04-26 ENCOUNTER — HOSPITAL ENCOUNTER (OUTPATIENT)
Dept: ULTRASOUND IMAGING | Age: 62
Discharge: HOME | End: 2022-04-26
Attending: INTERNAL MEDICINE
Payer: COMMERCIAL

## 2022-04-26 DIAGNOSIS — B18.2 CHRONIC HEPATITIS C WITHOUT HEPATIC COMA (HCC): ICD-10-CM

## 2022-04-26 PROCEDURE — 76700 US EXAM ABDOM COMPLETE: CPT

## 2022-05-27 ENCOUNTER — OFFICE VISIT (OUTPATIENT)
Dept: HEMATOLOGY | Age: 62
End: 2022-05-27
Payer: COMMERCIAL

## 2022-05-27 VITALS
SYSTOLIC BLOOD PRESSURE: 137 MMHG | BODY MASS INDEX: 26.49 KG/M2 | OXYGEN SATURATION: 98 % | RESPIRATION RATE: 16 BRPM | TEMPERATURE: 97 F | WEIGHT: 206.4 LBS | HEIGHT: 74 IN | DIASTOLIC BLOOD PRESSURE: 80 MMHG | HEART RATE: 66 BPM

## 2022-05-27 DIAGNOSIS — B18.2 CHRONIC HEPATITIS C WITHOUT HEPATIC COMA (HCC): Primary | ICD-10-CM

## 2022-05-27 PROCEDURE — 99213 OFFICE O/P EST LOW 20 MIN: CPT | Performed by: INTERNAL MEDICINE

## 2022-05-27 NOTE — Clinical Note
6/23/2022    Patient: Blayne Boland   YOB: 1960   Date of Visit: 5/27/2022     Walter Duque, [address] [place] Otha Leyden    Dear Walter Duque MD,      Thank you for referring Mr. Blayne Boland to [address] [facility] for evaluation. My notes for this consultation are attached. If you have questions, please do not hesitate to call me. I look forward to following your patient along with you.       Sincerely,    Zaida Aquino MD

## 2022-05-27 NOTE — PROGRESS NOTES
Identified pt with two pt identifiers(name and ). Reviewed record in preparation for visit and have obtained necessary documentation. Chief Complaint   Patient presents with    Hepatitis C     4 week f/u      Vitals:    22 0813   BP: 137/80   Pulse: 66   Resp: 16   Temp: 97 °F (36.1 °C)   TempSrc: Temporal   SpO2: 98%   Weight: 206 lb 6.4 oz (93.6 kg)   Height: 6' 2\" (1.88 m)   PainSc:   0 - No pain       Health Maintenance Review: Patient reminded of \"due or due soon\" health maintenance. I have asked the patient to contact his/her primary care provider (PCP) for follow-up on his/her health maintenance. Coordination of Care Questionnaire:  :   1) Have you been to an emergency room, urgent care, or hospitalized since your last visit? If yes, where when, and reason for visit? no       2. Have seen or consulted any other health care provider since your last visit? If yes, where when, and reason for visit? NO      Patient is accompanied by wife I have received verbal consent from [facility] to discuss any/all medical information while they are present in the room.

## 2022-05-27 NOTE — PROGRESS NOTES
[facility] [place], [place], [address], [place], [place]      MICAH Boo, [facility]     Hortencia CAI Renuka [facility]   Mallory Sebastian P-CIARA De La Cruz, [facility]       Kana Moore Jose Carlos De [address]    at [facility]    [address], [address] [place]    [address] [facility].    [phone]    FAX: [address] [place] [address]    at [address], [address], [address]    [phone]    FAX: [phone]       Patient Care Team:  Tucker Sanchez MD as PCP - General (Internal Medicine Physician)  Tucker Sanchez MD as PCP - [facility] Provider      Problem List  Date Reviewed: 4/19/2022          Codes Class Noted    Chronic hepatitis C [facility]) ICD-10-CM: B18.2  ICD-9-CM: 070.54  4/19/2022        Hypertension ICD-10-CM: I10  ICD-9-CM: 401.9  7/28/2012        History of cholecystectomy ICD-10-CM: Z90.49  ICD-9-CM: V45.79  7/28/2012                The clinicians listed above have asked me to see [facility] in consultation regarding chronic HCV and its management. All medical records sent by the referring physicians were reviewed     The patient is a 65 y.o. Black male who was screened for anti-HCV and tested positive in 11/2021. Risk factors for acquiring HCV are IV drug use, in 1990s. There was no history of acute icteric hepatitis at the time of these risk factors. Imaging of the liver was not performed. An assessment of liver fibrosis with biopsy or elastography has not been performed. The patient has never received treatment for chronic HCV. The patient does not have any symptoms which could be attributed to the liver disorder.     The patient is not experiencing the following symptoms which are commonly seen in this liver disorder:   fatigue,   pain in the right side over the liver,     The patient completes all daily activities without any functional limitations. ASSESSMENT AND PLAN:  Chronic HCV   Chronic HCV with stage 1 fibrosis. The severity of liver disease was assessed with sheer wave ultrasound. Liver transaminases are normal.  ALP is normal.  Liver function is normal.  The platelet count is normal.      Chronic HCV Treatment  The patient has not been treated for HCV. The patient has HCV genotype 1B. The patient should be treated with Mavyret (glecaprevir and piprentasvir) or Epclusa (sofosbuvir and velpatasvir)   The SVR/cure rate for is over 95%. Screening for Hepatocellular Carcinoma  HCC screening is not necessary if the patient has no evidence of cirrhosis. Treatment of other medical problems in patients with chronic liver disease  There are no contraindications for the patient to take most medications that are necessary for treatment of other medical issues. Counseling for alcohol in patients with chronic liver disease  The patient was counseled regarding alcohol consumption and the effect of alcohol on chronic liver disease. The patient does not consume any significant amount of alcohol. Substance Use  The patient has not used drugs since 1990s. Vaccinations   The need for vaccination against viral hepatitis A and B will be assessed with serologic and instituted as appropriate. The patient has received 2 doses of COVID-19 vaccine. Routine vaccinations against other bacterial and viral agents can be performed as indicated. Annual flu vaccination should be administered if indicated. ALLERGIES  No Known Allergies    MEDICATIONS  Current Outpatient Medications   Medication Sig    hydrALAZINE (APRESOLINE) 50 mg tablet 1 tab in AM and 2 tabs at bedtime    cholecalciferol (VITAMIN D3) (2,000 UNITS /50 MCG) cap capsule Take 1 Capsule by mouth daily.  amLODIPine (NORVASC) 10 mg tablet Take 1 Tablet by mouth daily.     valsartan-hydroCHLOROthiazide (DIOVAN-HCT) 320-25 mg per tablet Take 1 Tablet by mouth daily. No current facility-administered medications for this visit. SYSTEM REVIEW NOT RELATED TO LIVER DISEASE OR REVIEWED ABOVE:  Constitution systems: Negative for fever, chills, weight gain, weight loss. Eyes: Negative for visual changes. ENT: Negative for sore throat, painful swallowing. Respiratory: Negative for cough, hemoptysis, SOB. Cardiology: Negative for chest pain, palpitations. GI:  Negative for constipation or diarrhea. : Negative for urinary frequency, dysuria, hematuria, nocturia. Skin: Negative for rash. Hematology: Negative for easy bruising, blood clots. Musculo-skelatal: Negative for back pain, muscle pain, weakness. Neurologic: Negative for headaches, dizziness, vertigo, memory problems not related to HE. Psychology: Negative for anxiety, depression. FAMILY HISTORY:  The father  of cancer. The mother Has/had the following chronic disease(s): DM, HTN. There is no family history of liver disease. SOCIAL HISTORY:  The patient is . The patient has 3 children, and 6 grandchildren. The patient currently smokes 7 cigarettes per day. The patient consumes 3 alcoholic beverages per week. The patient currently works full time drinking [employer]. PHYSICAL EXAMINATION:  Visit Vitals  /80 (BP 1 Location: Left upper arm, BP Patient Position: Sitting, BP Cuff Size: Adult)   Pulse 66   Temp 97 °F (36.1 °C) (Temporal)   Resp 16   Ht 6' 2\" (1.88 m)   Wt 206 lb 6.4 oz (93.6 kg)   SpO2 98%   BMI 26.50 kg/m²     General: No acute distress. Eyes: Sclera anicteric. ENT: No oral lesions. Thyroid normal.  Nodes: No adenopathy. Skin: No spider angiomata. No jaundice. No palmar erythema. Respiratory: Lungs clear to auscultation. Cardiovascular: Regular heart rate. No murmurs. No JVD. Abdomen: Soft non-tender.   Liver size normal to percussion/palpation. Spleen not palpable. No obvious ascites. Extremities: No edema. No muscle wasting. No gross arthritic changes. Neurologic: Alert and oriented. Cranial nerves grossly intact. No asterixis. LABORATORY STUDIES:  [facility] [place] of [address] [place] [address] & Units 2/16/2022 11/2/2021   WBC 4.1 - 11.1 K/uL  4.2   ANC 1.8 - 8.0 K/UL     HGB 12.1 - 17.0 g/dL  13.9    - 400 K/uL  277   AST 15 - 37 U/L 26 27   ALT 12 - 78 U/L 33 37   Alk Phos 45 - 117 U/L 79 67   Bili, Total 0.2 - 1.0 MG/DL 0.4 0.4   Albumin 3.5 - 5.0 g/dL 3.7 3.6   BUN 6 - 20 MG/DL 17 17   Creat 0.70 - 1.30 MG/DL 1.43 (H) 1.37 (H)   Na 136 - 145 mmol/L 137 137   K 3.5 - 5.1 mmol/L 4.7 4.4   Cl 97 - 108 mmol/L 104 108   CO2 21 - 32 mmol/L 28 28   Glucose 65 - 100 mg/dL 110 (H) 107 (H)     SEROLOGIES:     Serologies Latest Ref Rng & Units 4/26/2022 3/17/2022   Hep B Surface Ag Index <0.10    Hep B Surface Ag Interp Negative   Negative    Hep B Core Ab, Total Negative   Negative    Hep B Surface Ab mIU/mL <3.10    Hep B Surface Ab Interp NONREACTIVE   NONREACTIVE    Hep C Ab NONREACTIVE    Reactive   Hep C Genotype  1b    HCV RT-PCR, Quant IU/mL  2,220,000     LIVER HISTOLOGY:  4/2022. Sheer wave elastography performed at [facility]. Median liver stiffness 6.6 kPa. IQR 15.6%. The results suggested a fibrosis level of F1.  5/2022. HCV Fibrosure. 0.54 which correlates with stage 2 fibrosis. ENDOSCOPIC PROCEDURES:  Not available or performed    RADIOLOGY:  4/2022. Ultrasound of liver. Normal appearing liver. No liver mass lesions. OTHER TESTING:  Not available or performed    FOLLOW-UP:  All of the issues listed above in the Assessment and Plan were discussed with the patient. All questions were answered. The patient expressed a clear understanding of the above. [address] [address] in 6 months for repeat HCV RNA testing after HCV treatment.         Jamaica Ray MD  [facility] [place] of [address] [id number] Cade Cain, [address] [facility] 22.  [address] [facility]

## 2022-06-13 ENCOUNTER — TELEPHONE (OUTPATIENT)
Dept: HEMATOLOGY | Age: 62
End: 2022-06-13

## 2022-06-15 RX ORDER — VELPATASVIR AND SOFOSBUVIR 100; 400 MG/1; MG/1
1 TABLET, FILM COATED ORAL DAILY
Qty: 90 TABLET | Refills: 0 | Status: SHIPPED | OUTPATIENT
Start: 2022-06-15

## 2022-06-20 ENCOUNTER — OFFICE VISIT (OUTPATIENT)
Dept: FAMILY MEDICINE CLINIC | Age: 62
End: 2022-06-20
Payer: COMMERCIAL

## 2022-06-20 VITALS
DIASTOLIC BLOOD PRESSURE: 85 MMHG | SYSTOLIC BLOOD PRESSURE: 129 MMHG | HEART RATE: 64 BPM | RESPIRATION RATE: 20 BRPM | HEIGHT: 74 IN | BODY MASS INDEX: 26.31 KG/M2 | OXYGEN SATURATION: 98 % | TEMPERATURE: 97.8 F | WEIGHT: 205 LBS

## 2022-06-20 DIAGNOSIS — Z23 ENCOUNTER FOR IMMUNIZATION: ICD-10-CM

## 2022-06-20 DIAGNOSIS — I10 HYPERTENSION, WELL CONTROLLED: Primary | ICD-10-CM

## 2022-06-20 DIAGNOSIS — B17.10 ACUTE HEPATITIS C VIRUS INFECTION WITHOUT HEPATIC COMA: ICD-10-CM

## 2022-06-20 PROCEDURE — 90677 PCV20 VACCINE IM: CPT | Performed by: INTERNAL MEDICINE

## 2022-06-20 PROCEDURE — 99214 OFFICE O/P EST MOD 30 MIN: CPT | Performed by: INTERNAL MEDICINE

## 2022-06-20 RX ORDER — HYDRALAZINE HYDROCHLORIDE 50 MG/1
TABLET, FILM COATED ORAL
Qty: 180 TABLET | Refills: 1 | Status: SHIPPED | OUTPATIENT
Start: 2022-06-20 | End: 2022-09-19 | Stop reason: SDUPTHER

## 2022-06-20 RX ORDER — VALSARTAN AND HYDROCHLOROTHIAZIDE 320; 25 MG/1; MG/1
1 TABLET, FILM COATED ORAL DAILY
Qty: 90 TABLET | Refills: 1 | Status: SHIPPED | OUTPATIENT
Start: 2022-06-20 | End: 2022-09-19 | Stop reason: SDUPTHER

## 2022-06-20 RX ORDER — AMLODIPINE BESYLATE 10 MG/1
10 TABLET ORAL DAILY
Qty: 90 TABLET | Refills: 1 | Status: SHIPPED | OUTPATIENT
Start: 2022-06-20 | End: 2022-09-19 | Stop reason: SDUPTHER

## 2022-06-20 NOTE — PROGRESS NOTES
Chief Complaint   Patient presents with    Follow-up     Routine Check Up      HPI:  Tonya Richard is a 65 y.o. AA male presents for 3 month follow-up. Patient is doing well. Blood pressure is at goal on current medications. Patient was seen and started on treatment chronic hepatitis C infection. Review of Systems  As per hpi    Past Medical History:   Diagnosis Date    Chronic hepatitis C virus infection ([address]     Hepatitis C infection     Hypertension        Past Surgical History:   Procedure Laterality Date    IR CHOLECYSTOSTOMY PERCUTANEOUS       Social History     Socioeconomic History    Marital status:    Tobacco Use    Smoking status: Current Every Day Smoker     Packs/day: 0.50    Smokeless tobacco: Never Used   Vaping Use    Vaping Use: Never used   Substance and Sexual Activity    Alcohol use: Yes     Comment: social    Drug use: Yes     Types: Marijuana    Sexual activity: Yes     Partners: Female     Family History   Problem Relation Age of Onset    Diabetes Mother      Current Outpatient Medications   Medication Sig Dispense Refill    sofosbuvir-velpatasvir (EPCLUSA) 400-100 mg tablet Take 1 Tablet by mouth daily. Indications: chronic infection of genotype 1 hepatitis C virus 90 Tablet 0    hydrALAZINE (APRESOLINE) 50 mg tablet 1 tab in AM and 2 tabs at bedtime 90 Tablet 3    cholecalciferol (VITAMIN D3) (2,000 UNITS /50 MCG) cap capsule Take 1 Capsule by mouth daily. 90 Capsule 1    amLODIPine (NORVASC) 10 mg tablet Take 1 Tablet by mouth daily. 30 Tablet 3    valsartan-hydroCHLOROthiazide (DIOVAN-HCT) 320-25 mg per tablet Take 1 Tablet by mouth daily.  90 Tablet 1     No Known Allergies    Objective:  Visit Vitals  /85   Pulse 64   Temp 97.8 °F (36.6 °C) (Temporal)   Resp 20   Ht 6' 2\" (1.88 m)   Wt 205 lb (93 kg)   SpO2 98%   BMI 26.32 kg/m²     Physical Exam:   General appearance - alert, well appearing in no distress  Mental status - alert, oriented to person, place, and time  Chest - clear to auscultation, no wheezes, rales or rhonchi  Heart - normal rate, regular rhythm, no murmurs  Abdomen - soft, nontender, nondistended, no organomegaly  Ext-peripheral pulses normal, no pedal edema  Neuro -no focal findings     Results for orders placed or performed in visit on 04/26/22   HCV FIBROSURE   Result Value Ref Range    Fibrosis score 0.54 (H) 0.00 - 0.21      Fibrosis stage Comment      Necroinflammation activity score 0.25 (H) 0.00 - 0.17      Necroinflammation activity grade SEE COMMENT     E1Nlnyxgnaxnxcfy 287 (H) 110 - 276 mg/dL    Haptoglobin 164 32 - 363 mg/dL    Apolipoprotein A-1 128 101 - 178 mg/dL    Bilirubin, total 0.4 0.0 - 1.2 mg/dL    GGT 90 (H) 0 - 65 IU/L    ALT (SGPT) 36 0 - 55 IU/L    Interpretation Comment      Fibrosis scoring Comment      Necroinflammation activity scoring Comment      Limitations Comment      Comment Comment     HEP B SURFACE AB   Result Value Ref Range    Hepatitis B surface Ab <3.10 mIU/mL    Hep B surface Ab Interp. NONREACTIVE NONREACTIVE     HEPATITIS B CORE AB, TOTAL   Result Value Ref Range    Hep B Core Ab, total Negative Negative     HEP B SURFACE AG   Result Value Ref Range    Hepatitis B surface Ag <0.10 Index    Hep B surface Ag Interp. Negative Negative     HEP A AB, TOTAL   Result Value Ref Range    Hep A Ab, total Negative Negative     HEP C GENOTYPE   Result Value Ref Range    Hepatitis C Genotype 1b      Please note Comment         Assessment/Plan:  Diagnoses and all orders for this visit:    Hypertension, well controlled  -     hydrALAZINE (APRESOLINE) 50 mg tablet; 1 tab in AM and 2 tabs at bedtime, Normal, Disp-180 Tablet, R-1  -     amLODIPine (NORVASC) 10 mg tablet; Take 1 Tablet by mouth daily. , Normal, Disp-90 Tablet, R-1  -     valsartan-hydroCHLOROthiazide (DIOVAN-HCT) 320-25 mg per tablet; Take 1 Tablet by mouth daily. , Normal, Disp-90 Tablet, R-1    Acute hepatitis C virus infection without hepatic coma    Encounter for immunization  -     PNEUMOCOCCAL, PCV20, PREVNAR 21, (AGE 25 YRS+), IM, PF      Patient Instructions          Pneumococcal Conjugate Vaccine for Children: Care Instructions  Overview     The pneumococcal shot (PCV13) protects against a type of bacteria that causes pneumonia, meningitis, blood infections (sepsis), and ear infections. All children need four doses--one at age 2 months, one at 4 months, one at 7 months, and one at 15 to 17 months. If your child does not get the shots in this time frame, ask your doctor about a schedule for catch-up shots. The shot may cause pain or swelling in the area where the shot is given. It may cause your child to feel sleepy or not feel like eating or cause a fever. These reactions may last 1 to 2 days. Follow-up care is a key part of your child's treatment and safety. Be sure to make and go to all appointments, and call your doctor if your child is having problems. It's also a good idea to know your child's test results and keep a list of the medicines your child takes. How can you care for your child at home? · Give your child acetaminophen (Tylenol) or ibuprofen (Advil, Motrin) for fever or for pain at the shot area. Be safe with medicines. Read and follow all instructions on the label. Do not give aspirin to anyone younger than 20. It has been linked to Reye syndrome, a serious illness. · Do not give a child two or more pain medicines at the same time unless the doctor told you to. Many pain medicines have acetaminophen, which is Tylenol. Too much acetaminophen (Tylenol) can be harmful. · Put ice or a cold pack on the sore area for 10 to 20 minutes at a time. Put a thin cloth between the ice and your child's skin. When should you call for help? Call 911 anytime you think your child may need emergency care. For example, call if:    · Your child has a seizure.     · Your child has symptoms of a severe allergic reaction.  These may include:  ? Sudden raised, red areas (hives) all over the body. ? Swelling of the throat, mouth, lips, or tongue. ? Trouble breathing. ? Passing out (losing consciousness). Or your child may feel very lightheaded or suddenly feel weak, confused, or restless. Call your doctor now or seek immediate medical care if:    · Your child has symptoms of an allergic reaction, such as:  ? A rash or hives (raised, red areas on the skin). ? Itching. ? Swelling. ? Belly pain, nausea, or vomiting.     · Your child has a high fever.     · Your child cries for 3 hours or more within 2 to 3 days after getting the shot. Watch closely for changes in your child's health, and be sure to contact your doctor if your child has any problems. Where can you learn more? Go to http://www.gray.com/  Enter V860 in the search box to learn more about \"Pneumococcal Conjugate Vaccine for Children: Care Instructions. \"  Current as of: July 1, 2021               Content Version: 13.2  © 2006-2022 [employer]. Care instructions adapted under license by [employer] (which disclaims liability or warranty for this information). If you have questions about a medical condition or this instruction, always ask your healthcare professional. [address] any warranty or liability for your use of this information. Follow-up and Dispositions    · Return in about 3 months (around 9/20/2022) for routine follow up.

## 2022-06-20 NOTE — PATIENT INSTRUCTIONS
Pneumococcal Conjugate Vaccine for Children: Care Instructions  Overview     The pneumococcal shot (PCV13) protects against a type of bacteria that causes pneumonia, meningitis, blood infections (sepsis), and ear infections. All children need four doses--one at age 2 months, one at 4 months, one at 7 months, and one at 15 to 17 months. If your child does not get the shots in this time frame, ask your doctor about a schedule for catch-up shots. The shot may cause pain or swelling in the area where the shot is given. It may cause your child to feel sleepy or not feel like eating or cause a fever. These reactions may last 1 to 2 days. Follow-up care is a key part of your child's treatment and safety. Be sure to make and go to all appointments, and call your doctor if your child is having problems. It's also a good idea to know your child's test results and keep a list of the medicines your child takes. How can you care for your child at home? · Give your child acetaminophen (Tylenol) or ibuprofen (Advil, Motrin) for fever or for pain at the shot area. Be safe with medicines. Read and follow all instructions on the label. Do not give aspirin to anyone younger than 20. It has been linked to Reye syndrome, a serious illness. · Do not give a child two or more pain medicines at the same time unless the doctor told you to. Many pain medicines have acetaminophen, which is Tylenol. Too much acetaminophen (Tylenol) can be harmful. · Put ice or a cold pack on the sore area for 10 to 20 minutes at a time. Put a thin cloth between the ice and your child's skin. When should you call for help? Call 911 anytime you think your child may need emergency care. For example, call if:    · Your child has a seizure.     · Your child has symptoms of a severe allergic reaction. These may include:  ? Sudden raised, red areas (hives) all over the body. ? Swelling of the throat, mouth, lips, or tongue. ?  Trouble breathing. ? Passing out (losing consciousness). Or your child may feel very lightheaded or suddenly feel weak, confused, or restless. Call your doctor now or seek immediate medical care if:    · Your child has symptoms of an allergic reaction, such as:  ? A rash or hives (raised, red areas on the skin). ? Itching. ? Swelling. ? Belly pain, nausea, or vomiting.     · Your child has a high fever.     · Your child cries for 3 hours or more within 2 to 3 days after getting the shot. Watch closely for changes in your child's health, and be sure to contact your doctor if your child has any problems. Where can you learn more? Go to http://www.gray.com/  Enter V860 in the search box to learn more about \"Pneumococcal Conjugate Vaccine for Children: Care Instructions. \"  Current as of: July 1, 2021               Content Version: 13.2  © 1386-5045 [employer]. Care instructions adapted under license by [employer] (which disclaims liability or warranty for this information). If you have questions about a medical condition or this instruction, always ask your healthcare professional. [address] [address] any warranty or liability for your use of this information.

## 2022-06-23 ENCOUNTER — TELEPHONE (OUTPATIENT)
Dept: HEMATOLOGY | Age: 62
End: 2022-06-23

## 2022-06-23 NOTE — TELEPHONE ENCOUNTER
----- Message from Poonam Matos MD sent at 6/23/2022  8:35 AM EDT -----  Regarding: CAn you call and see if she got Epclusa from [address] to treat HCV. If she does not have it check with pharamcy and find out what is going on. [email or web address] Spoke w/patient concerning above message from Michelle. Patient rec'd Earma Aj on 6/20/22 and started on the same day. Patient states \"I am doing good with the medication. \" (KF)

## 2022-09-19 ENCOUNTER — OFFICE VISIT (OUTPATIENT)
Dept: FAMILY MEDICINE CLINIC | Age: 62
End: 2022-09-19
Payer: COMMERCIAL

## 2022-09-19 VITALS
WEIGHT: 200 LBS | SYSTOLIC BLOOD PRESSURE: 133 MMHG | HEIGHT: 74 IN | OXYGEN SATURATION: 98 % | RESPIRATION RATE: 20 BRPM | BODY MASS INDEX: 25.67 KG/M2 | HEART RATE: 61 BPM | DIASTOLIC BLOOD PRESSURE: 79 MMHG | TEMPERATURE: 97.6 F

## 2022-09-19 DIAGNOSIS — F17.200 CURRENT SMOKER: ICD-10-CM

## 2022-09-19 DIAGNOSIS — B17.10 ACUTE HEPATITIS C VIRUS INFECTION WITHOUT HEPATIC COMA: ICD-10-CM

## 2022-09-19 DIAGNOSIS — Z12.11 COLON CANCER SCREENING: ICD-10-CM

## 2022-09-19 DIAGNOSIS — I10 HYPERTENSION, WELL CONTROLLED: Primary | ICD-10-CM

## 2022-09-19 PROCEDURE — 99214 OFFICE O/P EST MOD 30 MIN: CPT | Performed by: INTERNAL MEDICINE

## 2022-09-19 RX ORDER — VALSARTAN AND HYDROCHLOROTHIAZIDE 320; 25 MG/1; MG/1
1 TABLET, FILM COATED ORAL DAILY
Qty: 90 TABLET | Refills: 1 | Status: SHIPPED | OUTPATIENT
Start: 2022-09-19

## 2022-09-19 RX ORDER — HYDRALAZINE HYDROCHLORIDE 50 MG/1
TABLET, FILM COATED ORAL
Qty: 180 TABLET | Refills: 1 | Status: SHIPPED | OUTPATIENT
Start: 2022-09-19

## 2022-09-19 RX ORDER — AMLODIPINE BESYLATE 10 MG/1
10 TABLET ORAL DAILY
Qty: 90 TABLET | Refills: 1 | Status: SHIPPED | OUTPATIENT
Start: 2022-09-19

## 2022-09-19 NOTE — PROGRESS NOTES
Chief Complaint   Patient presents with    Follow-up     3 month Routine check Up     Labs     Requesting to include Hep C     1. \"Have you been to the ER, urgent care clinic since your last visit? Hospitalized since your last visit? \" No    2. \"Have you seen or consulted any other health care providers outside of the [address] [place] [address] since your last visit? \" No     3. For patients aged 39-70: Has the patient had a colonoscopy / FIT/ Cologuard? No      If the patient is female:    4. For patients aged 41-77: Has the patient had a mammogram within the past 2 years? NA - based on age or sex      11. For patients aged 21-65: Has the patient had a pap smear?  NA - based on age or sex

## 2022-09-19 NOTE — PROGRESS NOTES
Chief Complaint   Patient presents with    Follow-up     3 month Routine check Up     Labs     Requesting to include Hep C     HPI:  Aditya Hidalgo is a 65 y.o. male with h/o below presents for 3 month follow-up. Blood pressure is at goal. He as no complaints. Patient has completed Hep C treatment. Review of Systems  As per hpi    Past Medical History:   Diagnosis Date    Chronic hepatitis C virus infection ([address]     Hepatitis C infection     Hypertension      Past Surgical History:   Procedure Laterality Date    IR CHOLECYSTOSTOMY PERCUTANEOUS       Social History     Socioeconomic History    Marital status:    Tobacco Use    Smoking status: Every Day     Packs/day: 0.50     Types: Cigarettes    Smokeless tobacco: Never   Vaping Use    Vaping Use: Never used   Substance and Sexual Activity    Alcohol use: Yes     Comment: social    Drug use: Yes     Types: Marijuana    Sexual activity: Yes     Partners: Female     Family History   Problem Relation Age of Onset    Diabetes Mother      Current Outpatient Medications   Medication Sig Dispense Refill    hydrALAZINE (APRESOLINE) 50 mg tablet 1 tab in AM and 2 tabs at bedtime 180 Tablet 1    amLODIPine (NORVASC) 10 mg tablet Take 1 Tablet by mouth daily. 90 Tablet 1    valsartan-hydroCHLOROthiazide (DIOVAN-HCT) 320-25 mg per tablet Take 1 Tablet by mouth daily. 90 Tablet 1    sofosbuvir-velpatasvir (EPCLUSA) 400-100 mg tablet Take 1 Tablet by mouth daily. Indications: chronic infection of genotype 1 hepatitis C virus 90 Tablet 0    cholecalciferol (VITAMIN D3) (2,000 UNITS /50 MCG) cap capsule Take 1 Capsule by mouth daily.  90 Capsule 1     No Known Allergies    Objective:  Visit Vitals  /79   Pulse 61   Temp 97.6 °F (36.4 °C) (Temporal)   Resp 20   Ht 6' 2\" (1.88 m)   Wt 200 lb (90.7 kg)   SpO2 98%   BMI 25.68 kg/m²     Physical Exam:   General appearance - alert, well appearing in no distress  Mental status - alert, oriented to person, place, and time  Neck - supple, no significant adenopathy   Chest - clear to auscultation, no wheezes, rales or rhonchi  Heart - normal rate, regular rhythm, no murmurs  Abdomen - soft, nontender, nondistended, no organomegaly  Ext-peripheral pulses normal, no pedal edema  Neuro -no focal findings    Results for orders placed or performed in visit on 04/26/22   HCV FIBROSURE   Result Value Ref Range    Fibrosis score 0.54 (H) 0.00 - 0.21      Fibrosis stage Comment      Necroinflammation activity score 0.25 (H) 0.00 - 0.17      Necroinflammation activity grade SEE COMMENT     [id number] 287 (H) 110 - 276 mg/dL    Haptoglobin 164 32 - 363 mg/dL    Apolipoprotein A-1 128 101 - 178 mg/dL    Bilirubin, total 0.4 0.0 - 1.2 mg/dL    GGT 90 (H) 0 - 65 IU/L    ALT (SGPT) 36 0 - 55 IU/L    Interpretation Comment      Fibrosis scoring Comment      Necroinflammation activity scoring Comment      Limitations Comment      Comment Comment     HEP B SURFACE AB   Result Value Ref Range    Hepatitis B surface Ab <3.10 mIU/mL    Hep B surface Ab Interp. NONREACTIVE NONREACTIVE     HEPATITIS B CORE AB, TOTAL   Result Value Ref Range    Hep B Core Ab, total Negative Negative     HEP B SURFACE AG   Result Value Ref Range    Hepatitis B surface Ag <0.10 Index    Hep B surface Ag Interp. Negative Negative     HEP A AB, TOTAL   Result Value Ref Range    Hep A Ab, total Negative Negative     HEP C GENOTYPE   Result Value Ref Range    Hepatitis C Genotype 1b      Please note Comment       Assessment/Plan:  Diagnoses and all orders for this visit:    Hypertension, well controlled  -     amLODIPine (NORVASC) 10 mg tablet; Take 1 Tablet by mouth daily. , Normal, Disp-90 Tablet, R-1  -     hydrALAZINE (APRESOLINE) 50 mg tablet; 1 tab in AM and 2 tabs at bedtime, Normal, Disp-180 Tablet, R-1  -     valsartan-hydroCHLOROthiazide (DIOVAN-HCT) 320-25 mg per tablet; Take 1 Tablet by mouth daily. , Normal, Disp-90 Tablet, R-1    Acute hepatitis C virus infection without hepatic coma    Colon cancer screening  -     REFERRAL TO GASTROENTEROLOGY    Current smoker         Colon Cancer Screening: Care Instructions  Overview     Colorectal cancer occurs in the colon or rectum. That's the lower part of your digestive system. It often starts in small growths called polyps in the colon or rectum. Polyps are usually found with screening tests. Depending on the type of test, any polyps found may be removed during the tests. Colorectal cancer usually does not cause symptoms at first. But regular tests can help find it early, before it spreads and becomes harder to treat. Your risk for colorectal cancer gets higher as you get older. Experts recommend starting screening at age 39 for people who are at average risk. Talk with your doctor about your risk and when to start and stop screening. You may have one of several tests. Follow-up care is a key part of your treatment and safety. Be sure to make and go to all appointments, and call your doctor if you are having problems. It's also a good idea to know your test results and keep a list of the medicines you take. What are the main screening tests for colon cancer? The screening tests are:  Stool tests. These include the guaiac fecal occult blood test (gFOBT), the fecal immunochemical test (FIT), and the combined fecal immunochemical test and stool DNA test (FIT-DNA). These tests check stool samples for signs of cancer. If your test is positive, you will need to have a colonoscopy. Sigmoidoscopy. This test lets your doctor look at the lining of your rectum and the lowest part of your colon. Your doctor uses a lighted tube called a sigmoidoscope. This test can't find cancers or polyps in the upper part of your colon. In some cases, polyps that are found can be removed. But if your doctor finds polyps, you will need to have a colonoscopy to check the upper part of your colon. Colonoscopy.   This test lets your doctor look at the lining of your rectum and your entire colon. The doctor uses a thin, flexible tool called a colonoscope. It can also be used to remove polyps or get a tissue sample (biopsy). A less common test is CT colonography (CTC). It's also called virtual colonoscopy. Who should be screened for colorectal cancer? Your risk for colorectal cancer gets higher as you get older. Experts recommend starting screening at age 39 for people who are at average risk. Talk with your doctor about your risk and when to start and stop screening. How often you need screening depends on the type of test you get:  Stool tests. Every year for FIT or gFOBT. Every 1 to 3 years for sDNA, also called FIT-DNA. Tests that look inside the colon. Every 5 years for sigmoidoscopy. (If you do the FIT test every year, you can get this test every 10 years.)  Every 5 years for CT colonography (virtual colonoscopy). Every 10 years for colonoscopy. Experts agree that people at higher risk may need to be tested sooner and more often. This includes people who have a strong family history of colon cancer. Talk to your doctor about which test is best for you and when to be tested. When should you call for help? Watch closely for changes in your health, and be sure to contact your doctor if:    You have any changes in your bowel habits. You have any problems. Where can you learn more? Go to http://www.gray.com/  Enter M541 in the search box to learn more about \"Colon Cancer Screening: Care Instructions. \"  Current as of: September 8, 2021               Content Version: 13.2  © 2006-2022 [employer]. Care instructions adapted under license by [employer] (which disclaims liability or warranty for this information).  If you have questions about a medical condition or this instruction, always ask your healthcare professional. [place] [address] any warranty or liability for your use of this information. Follow-up and Dispositions    Return in about 3 months (around 12/19/2022) for routine follow up.

## 2022-12-16 ENCOUNTER — OFFICE VISIT (OUTPATIENT)
Dept: HEMATOLOGY | Age: 62
End: 2022-12-16
Payer: COMMERCIAL

## 2022-12-16 VITALS
TEMPERATURE: 97.2 F | BODY MASS INDEX: 25.29 KG/M2 | OXYGEN SATURATION: 97 % | WEIGHT: 197 LBS | SYSTOLIC BLOOD PRESSURE: 133 MMHG | DIASTOLIC BLOOD PRESSURE: 90 MMHG | HEART RATE: 100 BPM

## 2022-12-16 DIAGNOSIS — B18.2 CHRONIC HEPATITIS C WITHOUT HEPATIC COMA (HCC): Primary | ICD-10-CM

## 2022-12-16 NOTE — PROGRESS NOTES
Identified pt with two pt identifiers(name and ). Reviewed record in preparation for visit and have obtained necessary documentation. Chief Complaint   Patient presents with    Hepatitis C     6month follow up with April      Vitals:    22 1120   BP: (!) 133/90   Pulse: 100   Temp: 97.2 °F (36.2 °C)   TempSrc: Temporal   SpO2: 97%   Weight: 197 lb (89.4 kg)   PainSc:   0 - No pain       Health Maintenance Review: Patient reminded of \"due or due soon\" health maintenance. I have asked the patient to contact his/her primary care provider (PCP) for follow-up on his/her health maintenance. Coordination of Care Questionnaire:  :   1) Have you been to an emergency room, urgent care, or hospitalized since your last visit? If yes, where when, and reason for visit? no       2. Have seen or consulted any other health care provider since your last visit? If yes, where when, and reason for visit? NO      Patient is accompanied by spouse I have received verbal consent from [facility] to discuss any/all medical information while they are present in the room.

## 2022-12-16 NOTE — PROGRESS NOTES
[address] [facility], Dragan ROSE, Izzy Jesielaine Stevens, [place]      Nancy MICAH Black    Lora Dorsey, ACNP-BC     April AYALA Grayson, [facility]-BC   Radha Oneal P-CIARA Kay, [facility]       Kana Moore [address]    at [facility]    [address] [facility] [place], [address] [facility], [place]  22.    [phone]    FAX: [address] [place] [address] Drive, [address], [address]    [phone]    FAX: [phone]       Patient Care Team:  Donny Angulo MD as PCP - General (Internal Medicine Physician)  Donny Angulo MD as PCP - [address] [place] [address] Dr Zhu Provider      Problem List  Date Reviewed: 9/21/2022            Codes Class Noted    Chronic hepatitis C [facility]) ICD-10-CM: B18.2  ICD-9-CM: 070.54  4/19/2022        Hypertension ICD-10-CM: I10  ICD-9-CM: 401.9  7/28/2012        History of cholecystectomy ICD-10-CM: Z90.49  ICD-9-CM: V45.79  7/28/2012           Monica Sánchez is being seen at [facility] for management of chronic HCV. The active problem list, all pertinent past medical history, medications, radiologic findings and laboratory findings related to the liver disorder were reviewed and discussed with the patient. The patient is a 65 y.o. Black male who was screened for anti-HCV and tested positive in 11/2021. Risk factors for acquiring HCV are IV drug use, in 1990s. There was no history of acute icteric hepatitis at the time of these risk factors. An assessment of liver fibrosis was performed with HCV Fibrosure 5/2022. There score was 0.54 which correlates with stage 2 fibrosis. The patient completed an intended 12 weeks of hepatitis C treatment with Epclusa 6/2022-9/2022. He returns for testing to determine if he is a sustained viral responder and cured.      The patient does not have any symptoms which could be attributed to the liver disorder. The patient is not experiencing the following symptoms which are commonly seen in this liver disorder:   fatigue, pain in the right side over the liver. The patient completes all daily activities without any functional limitations. ASSESSMENT AND PLAN:  Chronic HCV   Chronic HCV with stage 1 fibrosis. An HCV Fibrosure was performed 5/2022. The score was 0.54 which correlates with stage 2 fibrosis. Have performed laboratory testing to monitor liver function and degree of liver injury. This included BMP, hepatic panel, and CBC with platelet count. Laboratory testing from 2/16/2022 reviewed in detail. Follow-up testing ordered today. The liver transaminases, ALP, liver function and platelet count are normal.     Chronic HCV Treatment  The patient has HCV genotype 1B. The patient completed an intended 12 weeks of hepatitis C treatment with Epclusa (sofosbuvir and velpatasvir) 6/2022-9/2022. An HCV RNA will be ordered today to determine if he is a sustained viral responder. If undetectable no further follow-up will be needed. Discussed the anti-HCV RNA will remain positive due to exposure. The only testing moving forward should be an HCV RNA. If he has re-exposure it is possible he could get the virus again. Screening for Hepatocellular Carcinoma  HCC screening is not necessary if the patient has no evidence of cirrhosis. Treatment of other medical problems in patients with chronic liver disease  There are no contraindications for the patient to take most medications that are necessary for treatment of other medical issues. Counseling for alcohol in patients with chronic liver disease  The patient was counseled regarding alcohol consumption and the effect of alcohol on chronic liver disease. The patient does not consume any significant amount of alcohol.     Substance Use  The patient has not used drugs since 1990s. Vaccinations   The need for vaccination against viral hepatitis A and B will be assessed with serologic and instituted as appropriate. The patient has received 2 doses of COVID-19 vaccine. Routine vaccinations against other bacterial and viral agents can be performed as indicated. Annual flu vaccination should be administered if indicated. ALLERGIES  No Known Allergies    MEDICATIONS  Current Outpatient Medications   Medication Sig    amLODIPine (NORVASC) 10 mg tablet Take 1 Tablet by mouth daily. hydrALAZINE (APRESOLINE) 50 mg tablet 1 tab in AM and 2 tabs at bedtime    valsartan-hydroCHLOROthiazide (DIOVAN-HCT) 320-25 mg per tablet Take 1 Tablet by mouth daily. sofosbuvir-velpatasvir (EPCLUSA) 400-100 mg tablet Take 1 Tablet by mouth daily. Indications: chronic infection of genotype 1 hepatitis C virus    cholecalciferol (VITAMIN D3) (2,000 UNITS /50 MCG) cap capsule Take 1 Capsule by mouth daily. No current facility-administered medications for this visit. SYSTEM REVIEW NOT RELATED TO LIVER DISEASE OR REVIEWED ABOVE:  Constitution systems: Negative for fever, chills, weight gain, weight loss. Eyes: Negative for visual changes. ENT: Negative for sore throat, painful swallowing. Respiratory: Negative for cough, hemoptysis, SOB. Cardiology: Negative for chest pain, palpitations. GI:  Negative for constipation or diarrhea. : Negative for urinary frequency, dysuria, hematuria, nocturia. Skin: Negative for rash. Hematology: Negative for easy bruising, blood clots. Musculo-skeletal: Negative for back pain, muscle pain, weakness. Neurologic: Negative for headaches, dizziness, vertigo, memory problems not related to HE. Psychology: Negative for anxiety, depression. FAMILY HISTORY:  The father  of cancer. The mother Has/had the following chronic disease(s): DM, HTN. There is no family history of liver disease.       SOCIAL HISTORY:  The patient is . The patient has 3 children, and 6 grandchildren. The patient currently smokes 7 cigarettes per day. The patient consumes 3 alcoholic beverages per week. The patient currently works full time [employer]. PHYSICAL EXAMINATION:  Visit Vitals  BP (!) 133/90 (BP 1 Location: Right arm, BP Patient Position: Sitting, BP Cuff Size: Adult long)   Pulse 100   Temp 97.2 °F (36.2 °C) (Temporal)   Wt 197 lb (89.4 kg)   SpO2 97%   BMI 25.29 kg/m²       General: No acute distress. Eyes: Sclera anicteric. ENT: No oral lesions. Thyroid normal.  Nodes: No adenopathy. Skin: No spider angiomata. No jaundice. No palmar erythema. Respiratory: Lungs clear to auscultation. Cardiovascular: Regular heart rate. No murmurs. No JVD. Abdomen: Soft non-tender, liver size normal to percussion/palpation. Spleen not palpable. No obvious ascites. Extremities: No edema. No muscle wasting. No gross arthritic changes. Neurologic: Alert and oriented. Cranial nerves grossly intact. No asterixis. LABORATORY STUDIES:  Liver [place] of [address] Units 2/16/2022 11/2/2021   WBC 4.1 - 11.1 K/uL  4.2   ANC 1.8 - 8.0 K/UL     HGB 12.1 - 17.0 g/dL  13.9    - 400 K/uL  277   AST 15 - 37 U/L 26 27   ALT 12 - 78 U/L 33 37   Alk Phos 45 - 117 U/L 79 67   Bili, Total 0.2 - 1.0 MG/DL 0.4 0.4   Albumin 3.5 - 5.0 g/dL 3.7 3.6   BUN 6 - 20 MG/DL 17 17   Creat 0.70 - 1.30 MG/DL 1.43 (H) 1.37 (H)   Na 136 - 145 mmol/L 137 137   K 3.5 - 5.1 mmol/L 4.7 4.4   Cl 97 - 108 mmol/L 104 108   CO2 21 - 32 mmol/L 28 28   Glucose 65 - 100 mg/dL 110 (H) 107 (H)     Laboratory testing from *2/16/2022 reviewed in detail. Additional testing included to evaluate progression or regression of disease. Laboratory testing results from today will be communicated by My Chart.      SEROLOGIES:   Serologies Latest Ref Rng & Units 4/26/2022 3/17/2022   Hep B Surface Ag Index <0.10    Hep B Surface Ag Interp Negative Negative    Hep B Core Ab, Total Negative   Negative    Hep B Surface Ab mIU/mL <3.10    Hep B Surface Ab Interp NONREACTIVE   NONREACTIVE    Hep C Ab NONREACTIVE    Reactive   Hep C Genotype  1b    HCV RT-PCR, Quant IU/mL  2,220,000     LIVER HISTOLOGY:  4/2022. Sheer wave elastography performed at [facility]. Median liver stiffness 6.6 kPa. IQR 15.6%. The results suggested a fibrosis level of F1.  5/2022. HCV Fibrosure. 0.54 which correlates with stage 2 fibrosis. ENDOSCOPIC PROCEDURES:  Not available or performed    RADIOLOGY:  4/2022. Ultrasound of liver. Normal appearing liver. No liver mass lesions. OTHER TESTING:  Not available or performed    FOLLOW-UP:  All of the issues listed above in the Assessment and Plan were discussed with the patient. All questions were answered. The patient expressed a clear understanding of the above. Follow-up [address] as needed only if the HCV RNA is undetectable. Hortencia Grayson, [facility] of [address] [address] [facility] [address] [id number] [address]ade Cain, [address] [place] [address] 22.  [address] [facility]

## 2022-12-17 LAB
ALBUMIN SERPL-MCNC: 4.2 G/DL (ref 3.5–5)
ALBUMIN/GLOB SERPL: 1.2 {RATIO} (ref 1.1–2.2)
ALP SERPL-CCNC: 84 U/L (ref 45–117)
ALT SERPL-CCNC: 17 U/L (ref 12–78)
ANION GAP SERPL CALC-SCNC: 5 MMOL/L (ref 5–15)
AST SERPL-CCNC: 16 U/L (ref 15–37)
BASOPHILS # BLD: 0.1 K/UL (ref 0–0.1)
BASOPHILS NFR BLD: 1 % (ref 0–1)
BILIRUB DIRECT SERPL-MCNC: 0.2 MG/DL (ref 0–0.2)
BILIRUB SERPL-MCNC: 0.8 MG/DL (ref 0.2–1)
BUN SERPL-MCNC: 17 MG/DL (ref 6–20)
BUN/CREAT SERPL: 11 (ref 12–20)
CALCIUM SERPL-MCNC: 10.1 MG/DL (ref 8.5–10.1)
CHLORIDE SERPL-SCNC: 105 MMOL/L (ref 97–108)
CO2 SERPL-SCNC: 29 MMOL/L (ref 21–32)
CREAT SERPL-MCNC: 1.56 MG/DL (ref 0.7–1.3)
DIFFERENTIAL METHOD BLD: ABNORMAL
EOSINOPHIL # BLD: 0.1 K/UL (ref 0–0.4)
EOSINOPHIL NFR BLD: 1 % (ref 0–7)
ERYTHROCYTE [DISTWIDTH] IN BLOOD BY AUTOMATED COUNT: 13.6 % (ref 11.5–14.5)
GLOBULIN SER CALC-MCNC: 3.4 G/DL (ref 2–4)
GLUCOSE SERPL-MCNC: 91 MG/DL (ref 65–100)
HCT VFR BLD AUTO: 41.4 % (ref 36.6–50.3)
HGB BLD-MCNC: 14.4 G/DL (ref 12.1–17)
IMM GRANULOCYTES # BLD AUTO: 0 K/UL (ref 0–0.04)
IMM GRANULOCYTES NFR BLD AUTO: 1 % (ref 0–0.5)
LYMPHOCYTES # BLD: 1.9 K/UL (ref 0.8–3.5)
LYMPHOCYTES NFR BLD: 26 % (ref 12–49)
MCH RBC QN AUTO: 27.2 PG (ref 26–34)
MCHC RBC AUTO-ENTMCNC: 34.8 G/DL (ref 30–36.5)
MCV RBC AUTO: 78.1 FL (ref 80–99)
MONOCYTES # BLD: 0.6 K/UL (ref 0–1)
MONOCYTES NFR BLD: 8 % (ref 5–13)
NEUTS SEG # BLD: 4.8 K/UL (ref 1.8–8)
NEUTS SEG NFR BLD: 63 % (ref 32–75)
NRBC # BLD: 0 K/UL (ref 0–0.01)
NRBC BLD-RTO: 0 PER 100 WBC
PLATELET # BLD AUTO: 271 K/UL (ref 150–400)
PMV BLD AUTO: 8.6 FL (ref 8.9–12.9)
POTASSIUM SERPL-SCNC: 3.9 MMOL/L (ref 3.5–5.1)
PROT SERPL-MCNC: 7.6 G/DL (ref 6.4–8.2)
RBC # BLD AUTO: 5.3 M/UL (ref 4.1–5.7)
SODIUM SERPL-SCNC: 139 MMOL/L (ref 136–145)
WBC # BLD AUTO: 7.5 K/UL (ref 4.1–11.1)

## 2022-12-20 PROBLEM — Z86.19 HEPATITIS C VIRUS INFECTION CURED AFTER ANTIVIRAL DRUG THERAPY: Status: ACTIVE | Noted: 2022-04-19

## 2022-12-20 NOTE — PROGRESS NOTES
Letter sent regarding the blood work results. He is now cured of hepatitis C. No further follow-up needed. Advised he follow-up with the PCP for elevated sr.  Creatinine

## 2024-07-24 ENCOUNTER — HOSPITAL ENCOUNTER (EMERGENCY)
Facility: HOSPITAL | Age: 64
Discharge: LEFT BEFORE DISCHARGE | End: 2024-07-24

## 2025-08-11 ENCOUNTER — TRANSCRIBE ORDERS (OUTPATIENT)
Facility: HOSPITAL | Age: 65
End: 2025-08-11

## 2025-08-11 DIAGNOSIS — N18.31 STAGE 3A CHRONIC KIDNEY DISEASE (HCC): Primary | ICD-10-CM
